# Patient Record
Sex: FEMALE | Race: WHITE | NOT HISPANIC OR LATINO | Employment: FULL TIME | URBAN - METROPOLITAN AREA
[De-identification: names, ages, dates, MRNs, and addresses within clinical notes are randomized per-mention and may not be internally consistent; named-entity substitution may affect disease eponyms.]

---

## 2018-09-04 ENCOUNTER — HOSPITAL ENCOUNTER (EMERGENCY)
Facility: HOSPITAL | Age: 18
Discharge: HOME | End: 2018-09-04
Attending: EMERGENCY MEDICINE
Payer: COMMERCIAL

## 2018-09-04 ENCOUNTER — OFFICE VISIT (OUTPATIENT)
Dept: UROLOGY | Facility: CLINIC | Age: 18
End: 2018-09-04
Payer: COMMERCIAL

## 2018-09-04 VITALS
WEIGHT: 175 LBS | OXYGEN SATURATION: 100 % | HEART RATE: 90 BPM | RESPIRATION RATE: 20 BRPM | BODY MASS INDEX: 29.88 KG/M2 | SYSTOLIC BLOOD PRESSURE: 132 MMHG | HEIGHT: 64 IN | DIASTOLIC BLOOD PRESSURE: 67 MMHG | TEMPERATURE: 99 F

## 2018-09-04 VITALS
SYSTOLIC BLOOD PRESSURE: 110 MMHG | HEART RATE: 66 BPM | WEIGHT: 183 LBS | BODY MASS INDEX: 31.24 KG/M2 | HEIGHT: 64 IN | DIASTOLIC BLOOD PRESSURE: 64 MMHG

## 2018-09-04 DIAGNOSIS — N13.2 URETERAL STONE WITH HYDRONEPHROSIS: Primary | ICD-10-CM

## 2018-09-04 DIAGNOSIS — N20.0 KIDNEY STONES: ICD-10-CM

## 2018-09-04 LAB
ALBUMIN SERPL BCP-MCNC: 4.3 G/DL
ALP SERPL-CCNC: 83 U/L
ALT SERPL W/O P-5'-P-CCNC: 28 U/L
ANION GAP SERPL CALC-SCNC: 10 MMOL/L
AST SERPL-CCNC: 27 U/L
B-HCG UR QL: NEGATIVE
BASOPHILS # BLD AUTO: 0.06 K/UL
BASOPHILS NFR BLD: 0.5 %
BILIRUB SERPL-MCNC: 0.5 MG/DL
BILIRUB UR QL STRIP: NEGATIVE
BUN SERPL-MCNC: 16 MG/DL
BUN SERPL-MCNC: 17 MG/DL (ref 6–30)
CALCIUM SERPL-MCNC: 9.5 MG/DL
CHLORIDE SERPL-SCNC: 104 MMOL/L (ref 95–110)
CHLORIDE SERPL-SCNC: 105 MMOL/L
CLARITY UR REFRACT.AUTO: ABNORMAL
CO2 SERPL-SCNC: 22 MMOL/L
COLOR UR AUTO: YELLOW
CREAT SERPL-MCNC: 1.1 MG/DL
CREAT SERPL-MCNC: 1.1 MG/DL (ref 0.5–1.4)
CTP QC/QA: YES
DIFFERENTIAL METHOD: NORMAL
EOSINOPHIL # BLD AUTO: 0.3 K/UL
EOSINOPHIL NFR BLD: 3 %
ERYTHROCYTE [DISTWIDTH] IN BLOOD BY AUTOMATED COUNT: 14.4 %
EST. GFR  (AFRICAN AMERICAN): >60 ML/MIN/1.73 M^2
EST. GFR  (NON AFRICAN AMERICAN): >60 ML/MIN/1.73 M^2
GLUCOSE SERPL-MCNC: 97 MG/DL
GLUCOSE SERPL-MCNC: 99 MG/DL (ref 70–110)
GLUCOSE UR QL STRIP: NEGATIVE
HCT VFR BLD AUTO: 39.5 %
HCT VFR BLD CALC: 40 %PCV (ref 36–54)
HGB BLD-MCNC: 12.8 G/DL
HGB UR QL STRIP: NEGATIVE
IMM GRANULOCYTES # BLD AUTO: 0.02 K/UL
IMM GRANULOCYTES NFR BLD AUTO: 0.2 %
KETONES UR QL STRIP: NEGATIVE
LEUKOCYTE ESTERASE UR QL STRIP: NEGATIVE
LIPASE SERPL-CCNC: 22 U/L
LYMPHOCYTES # BLD AUTO: 3 K/UL
LYMPHOCYTES NFR BLD: 26.3 %
MCH RBC QN AUTO: 29.6 PG
MCHC RBC AUTO-ENTMCNC: 32.4 G/DL
MCV RBC AUTO: 91 FL
MONOCYTES # BLD AUTO: 0.9 K/UL
MONOCYTES NFR BLD: 8.1 %
NEUTROPHILS # BLD AUTO: 7 K/UL
NEUTROPHILS NFR BLD: 61.9 %
NITRITE UR QL STRIP: NEGATIVE
NRBC BLD-RTO: 0 /100 WBC
PH UR STRIP: 5 [PH] (ref 5–8)
PLATELET # BLD AUTO: 300 K/UL
PMV BLD AUTO: 10.3 FL
POC IONIZED CALCIUM: 1.18 MMOL/L (ref 1.06–1.42)
POC TCO2 (MEASURED): 23 MMOL/L (ref 23–29)
POTASSIUM BLD-SCNC: 4 MMOL/L (ref 3.5–5.1)
POTASSIUM SERPL-SCNC: 4 MMOL/L
PROT SERPL-MCNC: 7.6 G/DL
PROT UR QL STRIP: NEGATIVE
RBC # BLD AUTO: 4.33 M/UL
SAMPLE: NORMAL
SODIUM BLD-SCNC: 139 MMOL/L (ref 136–145)
SODIUM SERPL-SCNC: 137 MMOL/L
SP GR UR STRIP: >1.03 (ref 1–1.03)
URN SPEC COLLECT METH UR: ABNORMAL
UROBILINOGEN UR STRIP-ACNC: NEGATIVE EU/DL
WBC # BLD AUTO: 11.35 K/UL

## 2018-09-04 PROCEDURE — 3008F BODY MASS INDEX DOCD: CPT | Mod: CPTII,S$GLB,, | Performed by: UROLOGY

## 2018-09-04 PROCEDURE — 81003 URINALYSIS AUTO W/O SCOPE: CPT

## 2018-09-04 PROCEDURE — 83690 ASSAY OF LIPASE: CPT

## 2018-09-04 PROCEDURE — 99284 EMERGENCY DEPT VISIT MOD MDM: CPT | Mod: ,,, | Performed by: PHYSICIAN ASSISTANT

## 2018-09-04 PROCEDURE — 96375 TX/PRO/DX INJ NEW DRUG ADDON: CPT | Mod: 59

## 2018-09-04 PROCEDURE — 99284 EMERGENCY DEPT VISIT MOD MDM: CPT | Mod: 25

## 2018-09-04 PROCEDURE — 99999 PR PBB SHADOW E&M-EST. PATIENT-LVL III: CPT | Mod: PBBFAC,,, | Performed by: UROLOGY

## 2018-09-04 PROCEDURE — 63600175 PHARM REV CODE 636 W HCPCS: Performed by: PHYSICIAN ASSISTANT

## 2018-09-04 PROCEDURE — 80053 COMPREHEN METABOLIC PANEL: CPT

## 2018-09-04 PROCEDURE — 96374 THER/PROPH/DIAG INJ IV PUSH: CPT

## 2018-09-04 PROCEDURE — 81025 URINE PREGNANCY TEST: CPT | Performed by: EMERGENCY MEDICINE

## 2018-09-04 PROCEDURE — 25500020 PHARM REV CODE 255: Performed by: EMERGENCY MEDICINE

## 2018-09-04 PROCEDURE — 85025 COMPLETE CBC W/AUTO DIFF WBC: CPT

## 2018-09-04 PROCEDURE — 99204 OFFICE O/P NEW MOD 45 MIN: CPT | Mod: S$GLB,,, | Performed by: UROLOGY

## 2018-09-04 PROCEDURE — 25000003 PHARM REV CODE 250: Performed by: PHYSICIAN ASSISTANT

## 2018-09-04 PROCEDURE — 96361 HYDRATE IV INFUSION ADD-ON: CPT

## 2018-09-04 RX ORDER — OXYCODONE AND ACETAMINOPHEN 5; 325 MG/1; MG/1
1 TABLET ORAL EVERY 4 HOURS PRN
Qty: 15 TABLET | Refills: 0 | Status: SHIPPED | OUTPATIENT
Start: 2018-09-04 | End: 2022-01-21

## 2018-09-04 RX ORDER — TAMSULOSIN HYDROCHLORIDE 0.4 MG/1
0.4 CAPSULE ORAL
Status: COMPLETED | OUTPATIENT
Start: 2018-09-04 | End: 2018-09-04

## 2018-09-04 RX ORDER — MORPHINE SULFATE 4 MG/ML
2 INJECTION, SOLUTION INTRAMUSCULAR; INTRAVENOUS
Status: COMPLETED | OUTPATIENT
Start: 2018-09-04 | End: 2018-09-04

## 2018-09-04 RX ORDER — ALBUTEROL SULFATE 90 UG/1
AEROSOL, METERED RESPIRATORY (INHALATION)
Refills: 2 | COMMUNITY
Start: 2018-08-20 | End: 2022-01-21

## 2018-09-04 RX ORDER — ONDANSETRON 2 MG/ML
4 INJECTION INTRAMUSCULAR; INTRAVENOUS
Status: COMPLETED | OUTPATIENT
Start: 2018-09-04 | End: 2018-09-04

## 2018-09-04 RX ORDER — TAMSULOSIN HYDROCHLORIDE 0.4 MG/1
0.4 CAPSULE ORAL DAILY
Qty: 20 CAPSULE | Refills: 0 | Status: SHIPPED | OUTPATIENT
Start: 2018-09-04 | End: 2022-01-21

## 2018-09-04 RX ORDER — KETOROLAC TROMETHAMINE 10 MG/1
10 TABLET, FILM COATED ORAL EVERY 6 HOURS PRN
Qty: 10 TABLET | Refills: 0 | Status: SHIPPED | OUTPATIENT
Start: 2018-09-04 | End: 2018-09-09

## 2018-09-04 RX ORDER — OXYCODONE AND ACETAMINOPHEN 5; 325 MG/1; MG/1
1 TABLET ORAL
Status: COMPLETED | OUTPATIENT
Start: 2018-09-04 | End: 2018-09-04

## 2018-09-04 RX ORDER — IBUPROFEN 800 MG/1
TABLET ORAL
Refills: 0 | COMMUNITY
Start: 2018-06-21 | End: 2018-09-04

## 2018-09-04 RX ORDER — KETOROLAC TROMETHAMINE 30 MG/ML
10 INJECTION, SOLUTION INTRAMUSCULAR; INTRAVENOUS
Status: COMPLETED | OUTPATIENT
Start: 2018-09-04 | End: 2018-09-04

## 2018-09-04 RX ORDER — MORPHINE SULFATE 4 MG/ML
4 INJECTION, SOLUTION INTRAMUSCULAR; INTRAVENOUS
Status: DISCONTINUED | OUTPATIENT
Start: 2018-09-04 | End: 2018-09-04

## 2018-09-04 RX ORDER — ACETAMINOPHEN AND CODEINE PHOSPHATE 300; 30 MG/1; MG/1
1 TABLET ORAL EVERY 6 HOURS PRN
Refills: 0 | COMMUNITY
Start: 2018-06-21 | End: 2022-01-21

## 2018-09-04 RX ORDER — ONDANSETRON HYDROCHLORIDE 8 MG/1
8 TABLET, FILM COATED ORAL EVERY 8 HOURS PRN
Qty: 20 TABLET | Refills: 0 | Status: SHIPPED | OUTPATIENT
Start: 2018-09-04 | End: 2022-01-21

## 2018-09-04 RX ORDER — ISOTRETINOIN 40 MG/1
40 CAPSULE ORAL 2 TIMES DAILY
COMMUNITY
End: 2022-01-21

## 2018-09-04 RX ADMIN — TAMSULOSIN HYDROCHLORIDE 0.4 MG: 0.4 CAPSULE ORAL at 04:09

## 2018-09-04 RX ADMIN — MORPHINE SULFATE 2 MG: 4 INJECTION INTRAVENOUS at 04:09

## 2018-09-04 RX ADMIN — SODIUM CHLORIDE 1000 ML: 0.9 INJECTION, SOLUTION INTRAVENOUS at 04:09

## 2018-09-04 RX ADMIN — OXYCODONE HYDROCHLORIDE AND ACETAMINOPHEN 1 TABLET: 5; 325 TABLET ORAL at 04:09

## 2018-09-04 RX ADMIN — SODIUM CHLORIDE 1000 ML: 0.9 INJECTION, SOLUTION INTRAVENOUS at 03:09

## 2018-09-04 RX ADMIN — ONDANSETRON HYDROCHLORIDE 4 MG: 2 INJECTION, SOLUTION INTRAMUSCULAR; INTRAVENOUS at 03:09

## 2018-09-04 RX ADMIN — ONDANSETRON HYDROCHLORIDE 4 MG: 2 INJECTION, SOLUTION INTRAMUSCULAR; INTRAVENOUS at 04:09

## 2018-09-04 RX ADMIN — KETOROLAC TROMETHAMINE 10 MG: 30 INJECTION, SOLUTION INTRAMUSCULAR at 03:09

## 2018-09-04 RX ADMIN — IOHEXOL 75 ML: 350 INJECTION, SOLUTION INTRAVENOUS at 03:09

## 2018-09-04 NOTE — DISCHARGE INSTRUCTIONS
Use Flomax once  daily until you passed the stone   Use Zofran as needed for nausea   Use Percocet as needed for pain  Strain all care   Drink plenty of fluids  Follow up with a urologist   Return to the ER for any new symptoms      Our goal in the emergency department is to always give you outstanding care and exceptional service. You may receive a survey by mail or e-mail in the next week regarding your experience in our ED. We would greatly appreciate your completing and returning the survey. Your feedback provides us with a way to recognize our staff who give very good care and it helps us learn how to improve when your experience was below our aspiration of excellence.

## 2018-09-04 NOTE — ED TRIAGE NOTES
Pt presents to the ED with c/o intermittent LRQ abd pain since 11pm. Pt reports pain 8/10 and +nausea. Pt denies V/D.     Patient identifiers verified and correct   LOC: The patient is awake, alert and aware of environment with an appropriate affect, the patient is oriented x 3 and speaking appropriately.   APPEARANCE: Patient appears comfortable and in no acute distress, patient is clean and well groomed.  SKIN: The skin is warm and dry, color consistent with ethnicity, patient has normal skin turgor and moist mucus membranes, skin intact, no breakdown or bruising noted.   MUSCULOSKELETAL: Patient moving all extremities spontaneously, no swelling noted.  RESPIRATORY: Airway is open and patent, respirations are spontaneous, patient has a normal effort and rate, no accessory muscle use noted

## 2018-09-04 NOTE — PATIENT INSTRUCTIONS
Low oxalate diet (limit spinach, rhubarb, nuts, beets, potatoes, chocolate)  2-3 liters of water a day. (avoid sodas, iced tea)  Limit salt.  Lots of fruits and veggies.  Limit non-dairy animal protein  Make sure you have milk and or yogurt in the diet  No tums, rolaids, vitamin C supplements, Multivitamin

## 2018-09-04 NOTE — ED PROVIDER NOTES
Encounter Date: 9/4/2018    SCRIBE #1 NOTE: I, Leland Bishop, am scribing for, and in the presence of,  Dr. Owen. I have scribed the following portions of the note - the APC attestation.       History     Chief Complaint   Patient presents with    Abdominal Pain      rlq pain that began ar ound 11 pm last night that has been intermittent since onsent with associated nausea. she reports feeling similar pain sat , but that it went away and is now back       18-year-old female presents to the ER for evaluation of right lower quadrant abdominal pain.  Pain began 3 days ago and has been intermittent.  Last night around 11:00 p.m. The pain got much worse.  She endorses nausea without vomiting.  Pain is localized to the right lower quadrant.  She denies any trauma or injury.  She denies any changes in bowel or bladder.  Last menstrual cycle was 3 weeks ago.          Review of patient's allergies indicates:  No Known Allergies  History reviewed. No pertinent past medical history.  History reviewed. No pertinent surgical history.  Family History   Problem Relation Age of Onset    Diabetes Maternal Grandmother      Social History     Tobacco Use    Smoking status: Never Smoker    Smokeless tobacco: Never Used   Substance Use Topics    Alcohol use: No     Frequency: Never    Drug use: No     Review of Systems   Constitutional: Negative for fever.   HENT: Negative for sore throat.    Respiratory: Negative for shortness of breath.    Cardiovascular: Negative for chest pain.   Gastrointestinal: Positive for abdominal pain and nausea.   Genitourinary: Negative for dysuria.   Musculoskeletal: Negative for back pain.   Skin: Negative for rash.   Neurological: Negative for weakness.   Hematological: Does not bruise/bleed easily.       Physical Exam     Initial Vitals [09/04/18 0238]   BP Pulse Resp Temp SpO2   (!) 151/88 88 20 98.7 °F (37.1 °C) 100 %      MAP       --         Physical Exam    Constitutional: Vital signs are  normal. She appears well-developed and well-nourished.   HENT:   Head: Normocephalic and atraumatic.   Eyes: Conjunctivae are normal.   Cardiovascular: Normal rate and regular rhythm.   Abdominal: Soft. Normal appearance and bowel sounds are normal. There is tenderness. There is tenderness at McBurney's point.   Musculoskeletal: Normal range of motion.   Neurological: She is alert and oriented to person, place, and time.   Skin: Skin is warm and intact.   Psychiatric: She has a normal mood and affect. Her speech is normal and behavior is normal. Cognition and memory are normal.         ED Course   Procedures  Labs Reviewed   COMPREHENSIVE METABOLIC PANEL - Abnormal; Notable for the following components:       Result Value    CO2 22 (*)     All other components within normal limits   URINALYSIS, REFLEX TO URINE CULTURE - Abnormal; Notable for the following components:    Appearance, UA Hazy (*)     Specific Gravity, UA >1.030 (*)     All other components within normal limits    Narrative:     Received a cup of urine.   CBC W/ AUTO DIFFERENTIAL   LIPASE   POCT URINE PREGNANCY   ISTAT PROCEDURE   ISTAT CHEM8          Imaging Results          CT Abdomen Pelvis With Contrast (Final result)     Abnormal  Result time 09/04/18 04:03:22    Final result by Sae Cook MD (09/04/18 04:03:22)                 Impression:      Visualized appendix unremarkable.    0.5 cm obstructive ureterolith at the right UPJ with associated moderate hydronephrosis of the right kidney.    Additional punctate calcifications seen within the left collecting system which likely represent additional nonobstructing nephroliths.    Small volume of free fluid in the pelvis.    Extensive stool burden throughout the colon suggestive for constipation.    This report was flagged in Epic as abnormal.    Electronically signed by resident: Johny Hopson  Date:    09/04/2018  Time:    03:43    Electronically signed by: Sae Cook  MD  Date:    09/04/2018  Time:    04:03             Narrative:    EXAMINATION:  CT ABDOMEN PELVIS WITH CONTRAST    CLINICAL HISTORY:  RLQ pain, appendicitis suspected;    TECHNIQUE:  Low dose axial images, sagittal and coronal reformations were obtained from the lung bases to the pubic symphysis following the IV administration of 75 mL of Omnipaque 350 .  Oral contrast was not administered..    COMPARISON:  None    FINDINGS:  ABDOMEN:    - Heart base: Normal in size without pericardial effusion.    - Lung bases: Well expanded bilaterally without significant pleural effusion, consolidation, pneumothorax, or mass.    - Liver: Normal in size and attenuation without focal lesion.    - Gallbladder: No calcified gallstones.    - Bile Ducts: No evidence of intra or extra hepatic biliary ductal dilation.    - Spleen: Negative.    - Kidneys: Normal in size and location with normal contrast enhancement pattern.  There is an obstructive ureterolith within the right UPJ with associated moderate hydronephrosis.  Stone measures approximately 0.5 cm in diameter.  Additional punctate calcifications seen within the left collecting system likely representing additional nonobstructing nephroliths.    - Adrenals: Unremarkable.    - Pancreas: No mass or peripancreatic fat stranding.    - Retroperitoneum:  No significant adenopathy.    - Vascular: No abdominal aortic aneurysm.    - Abdominal wall:  Unremarkable.    PELVIS:    Reproductive organs within normal limits for patient's age.  Right ovarian cyst noted.  Small volume of free fluid within the pelvis.    BOWEL/MESENTERY:    No evidence of bowel obstruction or inflammation.  The visualized appendix within normal limits.  Extensive stool burden throughout the colon suggestive for constipation.    BONES:  No acute osseous abnormality and no suspicious lytic or blastic lesion.                                 Medical Decision Making:   History:   Old Medical Records: I decided to obtain  old medical records.  Clinical Tests:   Lab Tests: Ordered and Reviewed  Radiological Study: Ordered and Reviewed  ED Management:   18-year-old female with abdominal pain   no acute findings on labs or urinalysis   CT scan shows evidence of a 5 mm right ureteral stone with mild hydronephrosis   no evidence of infection,  Renal function maintained,  Patient is urinating well     plan:  Discharged home with pain medication, antiemetic medication and Flomax   strict return precautions given and recommend follow-up with Urology   patient discharged home.            Scribe Attestation:   Scribe #1: I performed the above scribed service and the documentation accurately describes the services I performed. I attest to the accuracy of the note.    Attending Attestation:     Physician Attestation Statement for NP/PA:   I discussed this assessment and plan of this patient with the NP/PA, but I did not personally examine the patient. The face to face encounter was performed by the NP/PA.                     Clinical Impression:   The encounter diagnosis was Ureteral stone with hydronephrosis.      Disposition:   Disposition: Discharged  Condition: Stable                        Dajuan Trejo PA-C  09/04/18 0442

## 2018-09-04 NOTE — PROGRESS NOTES
Subjective:       Patient ID: Jeremiah Medina is a 18 y.o. female.    Chief Complaint: Nephrolithiasis    Jeremiah Medina is a 18 y.o. Female here for kidney stones.   4 days ago, she had lower abdominal pain and then later that night sharp stabbing pain.   Pain resolved. Then 11pm last pm, continued pain and went to ER.     No fever. No n/v.     No previous hx of stones.   No family hx of stones.     No medical problems.     She plays volleyball for [employer].     She drinks mostly water for the last month. 60 ounces a day.   Over the summer a lot of water.   No MVI.   She takes a good big of ibuprofen.       No recurrent UTI.     No tums or rolaids.     Minimal oxalate.   Good bit of salt.         History reviewed. No pertinent surgical history.    History reviewed. No pertinent past medical history.    Social History     Socioeconomic History    Marital status: Single     Spouse name: Not on file    Number of children: Not on file    Years of education: Not on file    Highest education level: Not on file   Social Needs    Financial resource strain: Not on file    Food insecurity - worry: Not on file    Food insecurity - inability: Not on file    Transportation needs - medical: Not on file    Transportation needs - non-medical: Not on file   Occupational History    Not on file   Tobacco Use    Smoking status: Never Smoker    Smokeless tobacco: Never Used   Substance and Sexual Activity    Alcohol use: No     Frequency: Never    Drug use: No    Sexual activity: No   Other Topics Concern    Not on file   Social History Narrative    Not on file       Family History   Problem Relation Age of Onset    Diabetes Maternal Grandmother        Current Outpatient Medications   Medication Sig Dispense Refill    acetaminophen-codeine 300-30mg (TYLENOL #3) 300-30 mg Tab Take 1 tablet by mouth every 6 (six) hours as needed. FOR SEVERE PAIN  0     mg tablet TAKE ONE TABLET BY MOUTH THREE TIMES DAILY (start  AFTER ketorolac)  0    ISOtretinoin (ACCUTANE) 40 MG capsule Take 40 mg by mouth 2 (two) times daily.      ondansetron (ZOFRAN) 8 MG tablet Take 1 tablet (8 mg total) by mouth every 8 (eight) hours as needed for Nausea. 20 tablet 0    oxyCODONE-acetaminophen (PERCOCET) 5-325 mg per tablet Take 1 tablet by mouth every 4 (four) hours as needed for Pain. 15 tablet 0    tamsulosin (FLOMAX) 0.4 mg Cap Take 1 capsule (0.4 mg total) by mouth once daily. 20 capsule 0    VENTOLIN HFA 90 mcg/actuation inhaler INHALE TWO TO FOUR PUFFS 30 minutes prior TO exercise  2     No current facility-administered medications for this visit.        Review of patient's allergies indicates:  No Known Allergies     BMP  Lab Results   Component Value Date     09/04/2018    K 4.0 09/04/2018     09/04/2018    CO2 22 (L) 09/04/2018    BUN 16 09/04/2018    CREATININE 1.1 09/04/2018    CALCIUM 9.5 09/04/2018    ANIONGAP 10 09/04/2018    ESTGFRAFRICA >60.0 09/04/2018    EGFRNONAA >60.0 09/04/2018       Review of Systems   Constitutional: Negative for chills, fever and unexpected weight change.   HENT: Negative for nosebleeds.    Eyes: Negative for visual disturbance.   Respiratory: Negative for chest tightness.    Cardiovascular: Negative for chest pain.   Gastrointestinal: Negative for diarrhea.   Genitourinary: Negative for dysuria, frequency, nocturia and urgency.   Musculoskeletal: Positive for back pain. Negative for myalgias.   Skin: Negative for rash.   Neurological: Negative for seizures.   Hematological: Does not bruise/bleed easily.   Psychiatric/Behavioral: Negative for behavioral problems.     Objective:      Physical Exam   Vitals reviewed.  Constitutional: She is oriented to person, place, and time. She appears well-developed and well-nourished.   HENT:   Head: Normocephalic and atraumatic.   Eyes: No scleral icterus.   Cardiovascular: Normal rate and regular rhythm.    Pulmonary/Chest: Effort normal. No stridor. No  respiratory distress.   Abdominal: She exhibits no distension.   Musculoskeletal: She exhibits no deformity.   Neurological: She is alert and oriented to person, place, and time.   Skin: Skin is warm and dry. No rash noted.     Psychiatric: She has a normal mood and affect.     urine dip trace protein, pH 6  Assessment:       1. Kidney stones        Plan:   Jeremiah was seen today for nephrolithiasis.    Diagnoses and all orders for this visit:    Kidney stones  -     PTH, intact; Future  -     Uric acid; Future  -     X-Ray Abdomen AP 1 View; Future    Other orders  -     ketorolac (TORADOL) 10 mg tablet; Take 1 tablet (10 mg total) by mouth every 6 (six) hours as needed for Pain.      Low oxalate diet (limit spinach, rhubarb, nuts, beets, potatoes, chocolate)  2-3 liters of water a day. (avoid sodas, iced tea)  Limit salt.  Lots of fruits and veggies.  Limit non-dairy animal protein  Make sure you have milk and or yogurt in the diet  No tums, rolaids, vitamin C supplements, Multivitamin    Consider right ESWL/ureteroscopy as patient is a  sooner than later and has to travel with team.   Needs metabolic workup in future.   Needs KUB - got contrast in ER. KUB on Thursday.

## 2018-09-05 ENCOUNTER — PATIENT MESSAGE (OUTPATIENT)
Dept: UROLOGY | Facility: CLINIC | Age: 18
End: 2018-09-05

## 2018-09-06 ENCOUNTER — PATIENT MESSAGE (OUTPATIENT)
Dept: UROLOGY | Facility: CLINIC | Age: 18
End: 2018-09-06

## 2018-09-06 ENCOUNTER — HOSPITAL ENCOUNTER (OUTPATIENT)
Dept: RADIOLOGY | Facility: HOSPITAL | Age: 18
Discharge: HOME | End: 2018-09-06
Attending: UROLOGY
Payer: COMMERCIAL

## 2018-09-06 DIAGNOSIS — N20.0 KIDNEY STONES: ICD-10-CM

## 2018-09-06 PROCEDURE — 74018 RADEX ABDOMEN 1 VIEW: CPT | Mod: TC

## 2018-09-06 PROCEDURE — 74018 RADEX ABDOMEN 1 VIEW: CPT | Mod: 26,,, | Performed by: RADIOLOGY

## 2018-09-11 ENCOUNTER — TELEPHONE (OUTPATIENT)
Dept: UROLOGY | Facility: CLINIC | Age: 18
End: 2018-09-11

## 2018-09-11 DIAGNOSIS — N20.0 KIDNEY STONES: Primary | ICD-10-CM

## 2018-09-12 ENCOUNTER — PATIENT MESSAGE (OUTPATIENT)
Dept: UROLOGY | Facility: CLINIC | Age: 18
End: 2018-09-12

## 2018-09-12 ENCOUNTER — TELEPHONE (OUTPATIENT)
Dept: UROLOGY | Facility: CLINIC | Age: 18
End: 2018-09-12

## 2018-09-13 ENCOUNTER — TELEPHONE (OUTPATIENT)
Dept: UROLOGY | Facility: CLINIC | Age: 18
End: 2018-09-13

## 2018-09-13 NOTE — TELEPHONE ENCOUNTER
Called pt to confirm 10:45am arrival time for procedure. Gave pt NPO instructions and gave pt opportunity to ask questions. Pt verbalized understanding.

## 2018-09-14 ENCOUNTER — ANESTHESIA (OUTPATIENT)
Dept: SURGERY | Facility: HOSPITAL | Age: 18
End: 2018-09-14
Payer: COMMERCIAL

## 2018-09-14 ENCOUNTER — HOSPITAL ENCOUNTER (OUTPATIENT)
Facility: HOSPITAL | Age: 18
Discharge: HOME | End: 2018-09-14
Attending: UROLOGY | Admitting: UROLOGY
Payer: COMMERCIAL

## 2018-09-14 ENCOUNTER — ANESTHESIA EVENT (OUTPATIENT)
Dept: SURGERY | Facility: HOSPITAL | Age: 18
End: 2018-09-14
Payer: COMMERCIAL

## 2018-09-14 VITALS
OXYGEN SATURATION: 99 % | HEIGHT: 64 IN | HEART RATE: 62 BPM | BODY MASS INDEX: 31.07 KG/M2 | RESPIRATION RATE: 17 BRPM | DIASTOLIC BLOOD PRESSURE: 89 MMHG | WEIGHT: 182 LBS | SYSTOLIC BLOOD PRESSURE: 143 MMHG | TEMPERATURE: 98 F

## 2018-09-14 DIAGNOSIS — N20.9 UROLITHIASIS: Primary | ICD-10-CM

## 2018-09-14 DIAGNOSIS — N20.1 URETERAL STONE: ICD-10-CM

## 2018-09-14 LAB
B-HCG UR QL: NEGATIVE
CTP QC/QA: YES

## 2018-09-14 PROCEDURE — 27201423 OPTIME MED/SURG SUP & DEVICES STERILE SUPPLY: Performed by: UROLOGY

## 2018-09-14 PROCEDURE — C1758 CATHETER, URETERAL: HCPCS | Performed by: UROLOGY

## 2018-09-14 PROCEDURE — 36000706: Performed by: UROLOGY

## 2018-09-14 PROCEDURE — 52332 CYSTOSCOPY AND TREATMENT: CPT | Mod: 51,RT,, | Performed by: UROLOGY

## 2018-09-14 PROCEDURE — 37000009 HC ANESTHESIA EA ADD 15 MINS: Performed by: UROLOGY

## 2018-09-14 PROCEDURE — 37000008 HC ANESTHESIA 1ST 15 MINUTES: Performed by: UROLOGY

## 2018-09-14 PROCEDURE — 63600175 PHARM REV CODE 636 W HCPCS: Performed by: NURSE ANESTHETIST, CERTIFIED REGISTERED

## 2018-09-14 PROCEDURE — 74420 UROGRAPHY RTRGR +-KUB: CPT | Mod: 26,,, | Performed by: UROLOGY

## 2018-09-14 PROCEDURE — C2617 STENT, NON-COR, TEM W/O DEL: HCPCS | Performed by: UROLOGY

## 2018-09-14 PROCEDURE — 25000003 PHARM REV CODE 250: Performed by: UROLOGY

## 2018-09-14 PROCEDURE — 52352 CYSTOURETERO W/STONE REMOVE: CPT | Mod: RT,,, | Performed by: UROLOGY

## 2018-09-14 PROCEDURE — 71000044 HC DOSC ROUTINE RECOVERY FIRST HOUR: Performed by: UROLOGY

## 2018-09-14 PROCEDURE — D9220A PRA ANESTHESIA: Mod: CRNA,,, | Performed by: NURSE ANESTHETIST, CERTIFIED REGISTERED

## 2018-09-14 PROCEDURE — 25500020 PHARM REV CODE 255: Performed by: UROLOGY

## 2018-09-14 PROCEDURE — D9220A PRA ANESTHESIA: Mod: ANES,,, | Performed by: ANESTHESIOLOGY

## 2018-09-14 PROCEDURE — 36000707: Performed by: UROLOGY

## 2018-09-14 PROCEDURE — 81025 URINE PREGNANCY TEST: CPT | Performed by: UROLOGY

## 2018-09-14 PROCEDURE — 63600175 PHARM REV CODE 636 W HCPCS: Performed by: STUDENT IN AN ORGANIZED HEALTH CARE EDUCATION/TRAINING PROGRAM

## 2018-09-14 PROCEDURE — 82365 CALCULUS SPECTROSCOPY: CPT

## 2018-09-14 PROCEDURE — 71000015 HC POSTOP RECOV 1ST HR: Performed by: UROLOGY

## 2018-09-14 PROCEDURE — C1769 GUIDE WIRE: HCPCS | Performed by: UROLOGY

## 2018-09-14 PROCEDURE — 76000 FLUOROSCOPY <1 HR PHYS/QHP: CPT | Mod: 26,59,, | Performed by: UROLOGY

## 2018-09-14 DEVICE — STENT URETERAL UNIV 6FR 24CM: Type: IMPLANTABLE DEVICE | Site: URETER | Status: FUNCTIONAL

## 2018-09-14 RX ORDER — CEFAZOLIN SODIUM 1 G/3ML
2 INJECTION, POWDER, FOR SOLUTION INTRAMUSCULAR; INTRAVENOUS
Status: COMPLETED | OUTPATIENT
Start: 2018-09-14 | End: 2018-09-14

## 2018-09-14 RX ORDER — DIPHENHYDRAMINE HYDROCHLORIDE 50 MG/ML
25 INJECTION INTRAMUSCULAR; INTRAVENOUS EVERY 6 HOURS PRN
Status: DISCONTINUED | OUTPATIENT
Start: 2018-09-14 | End: 2018-09-14 | Stop reason: HOSPADM

## 2018-09-14 RX ORDER — KETOROLAC TROMETHAMINE 10 MG/1
10 TABLET, FILM COATED ORAL EVERY 6 HOURS PRN
Qty: 12 TABLET | Refills: 0 | Status: SHIPPED | OUTPATIENT
Start: 2018-09-14 | End: 2022-01-21

## 2018-09-14 RX ORDER — SODIUM CHLORIDE 9 MG/ML
INJECTION, SOLUTION INTRAVENOUS CONTINUOUS
Status: DISCONTINUED | OUTPATIENT
Start: 2018-09-14 | End: 2018-09-14 | Stop reason: HOSPADM

## 2018-09-14 RX ORDER — SODIUM CHLORIDE 0.9 % (FLUSH) 0.9 %
3 SYRINGE (ML) INJECTION
Status: DISCONTINUED | OUTPATIENT
Start: 2018-09-14 | End: 2018-09-14 | Stop reason: HOSPADM

## 2018-09-14 RX ORDER — FENTANYL CITRATE 50 UG/ML
INJECTION, SOLUTION INTRAMUSCULAR; INTRAVENOUS
Status: DISCONTINUED | OUTPATIENT
Start: 2018-09-14 | End: 2018-09-14

## 2018-09-14 RX ORDER — MIDAZOLAM HYDROCHLORIDE 1 MG/ML
INJECTION, SOLUTION INTRAMUSCULAR; INTRAVENOUS
Status: DISCONTINUED | OUTPATIENT
Start: 2018-09-14 | End: 2018-09-14

## 2018-09-14 RX ORDER — HYDROMORPHONE HYDROCHLORIDE 1 MG/ML
0.2 INJECTION, SOLUTION INTRAMUSCULAR; INTRAVENOUS; SUBCUTANEOUS EVERY 5 MIN PRN
Status: DISCONTINUED | OUTPATIENT
Start: 2018-09-14 | End: 2018-09-14 | Stop reason: HOSPADM

## 2018-09-14 RX ORDER — LIDOCAINE HYDROCHLORIDE 10 MG/ML
1 INJECTION, SOLUTION EPIDURAL; INFILTRATION; INTRACAUDAL; PERINEURAL ONCE
Status: COMPLETED | OUTPATIENT
Start: 2018-09-14 | End: 2018-09-14

## 2018-09-14 RX ORDER — ONDANSETRON 2 MG/ML
INJECTION INTRAMUSCULAR; INTRAVENOUS
Status: DISCONTINUED | OUTPATIENT
Start: 2018-09-14 | End: 2018-09-14

## 2018-09-14 RX ORDER — OXYCODONE AND ACETAMINOPHEN 5; 325 MG/1; MG/1
1 TABLET ORAL EVERY 4 HOURS PRN
Status: DISCONTINUED | OUTPATIENT
Start: 2018-09-14 | End: 2018-09-14 | Stop reason: HOSPADM

## 2018-09-14 RX ORDER — PROPOFOL 10 MG/ML
VIAL (ML) INTRAVENOUS
Status: DISCONTINUED | OUTPATIENT
Start: 2018-09-14 | End: 2018-09-14

## 2018-09-14 RX ORDER — LIDOCAINE HCL/PF 100 MG/5ML
SYRINGE (ML) INTRAVENOUS
Status: DISCONTINUED | OUTPATIENT
Start: 2018-09-14 | End: 2018-09-14

## 2018-09-14 RX ORDER — LORAZEPAM 2 MG/ML
0.25 INJECTION INTRAMUSCULAR ONCE AS NEEDED
Status: DISCONTINUED | OUTPATIENT
Start: 2018-09-14 | End: 2018-09-14 | Stop reason: HOSPADM

## 2018-09-14 RX ADMIN — SODIUM CHLORIDE: 0.9 INJECTION, SOLUTION INTRAVENOUS at 01:09

## 2018-09-14 RX ADMIN — FENTANYL CITRATE 50 MCG: 50 INJECTION, SOLUTION INTRAMUSCULAR; INTRAVENOUS at 01:09

## 2018-09-14 RX ADMIN — ONDANSETRON 4 MG: 2 INJECTION INTRAMUSCULAR; INTRAVENOUS at 01:09

## 2018-09-14 RX ADMIN — LIDOCAINE HYDROCHLORIDE 2 MG: 10 INJECTION, SOLUTION EPIDURAL; INFILTRATION; INTRACAUDAL at 11:09

## 2018-09-14 RX ADMIN — SODIUM CHLORIDE: 0.9 INJECTION, SOLUTION INTRAVENOUS at 11:09

## 2018-09-14 RX ADMIN — CEFAZOLIN 2 G: 330 INJECTION, POWDER, FOR SOLUTION INTRAMUSCULAR; INTRAVENOUS at 01:09

## 2018-09-14 RX ADMIN — PROPOFOL 200 MG: 10 INJECTION, EMULSION INTRAVENOUS at 01:09

## 2018-09-14 RX ADMIN — LIDOCAINE HYDROCHLORIDE 100 MG: 20 INJECTION, SOLUTION INTRAVENOUS at 01:09

## 2018-09-14 RX ADMIN — MIDAZOLAM HYDROCHLORIDE 2 MG: 1 INJECTION, SOLUTION INTRAMUSCULAR; INTRAVENOUS at 01:09

## 2018-09-14 NOTE — INTERVAL H&P NOTE
The patient has been examined and the H&P has been reviewed:    I concur with the findings and no changes have occurred since H&P was written.    Anesthesia/Surgery risks, benefits and alternative options discussed and understood by patient/family.          Active Hospital Problems    Diagnosis  POA    Urolithiasis [N20.9]  Yes      Resolved Hospital Problems   No resolved problems to display.

## 2018-09-14 NOTE — OP NOTE
[facility]  Operative Note    Date: 09/14/2018    Pre-Op Diagnosis: right ureteral stone    Post-Op Diagnosis: same    Procedure(s) Performed:   1.  Right ureteroscopy with stone basket extraction  2.  Cystoscopy  3.  Right retrograde pyelogram  4.  Right ureteral stent insertion  5.  Fluoro < 1 h with radiographic interpretation    Specimen(s): ureteral stone    Staff Surgeon: Mian Childs MD    Assistant Surgeon: Edward Burrows MD    Anesthesia: General LMA anesthesia    Indications: Jeremiah Medina is a 18 y.o. female with a right ureteral stone, presenting for definitive stone management.  She currently does not have a JJ ureteral stent in place.      Findings:   Right 6 mm ureteral stone removed with basket extraction.      Estimated Blood Loss: min    Drains: 6 Fr x 24 cm JJ ureteral stent with strings    Procedure in detail:  After informed consent was obtained, the patient was brought the the cystoscopy suite and placed in the supine position.  SCDs were applied and working.  Anesthesia was administered.  The patient was then placed in the dorsal lithotomy position and prepped and draped in the usual sterile fashion.      A rigid cystoscope in a 22 Fr sheath was introduced into the patient's urethra.  This passed easily.  The entire urethra was visualized which showed no strictures or masses.  Formal cystoscopy was performed which revealed no masses or lesions suspicious for malignancy, no bladder stones, no bladder diverticuli, no trabeculations.  The ureteral orifices were visualized in the normal anatomic position bilaterally and clear efflux was visualized.      A motion wire was passed up the right ureteral orifice and up into the kidney.  This passed easily and placement was confirmed using fluoro.  The cystoscope was removed keeping the guidewire in place and the wire was secured to the drape.      An 8 Fr rigid ureteroscope was passed into the patient's bladder alongside the wire  under direct vision.  It was then passed through the right ureteral orifice alongside the wire.  A stone was encountered at the level of the proximal ureter just distal to the UPJ.  A Nitinol tipless basket was introduced through the ureteroscope.  The stone was removed from the ureter and taken out of the patient. The ureteroscope was reintroduced into the bladder and up into the right ureter to the level of the UPJ. There were no additional right ureteral stones. A right retrograde pyelogram was performed to map out the renal pelvis and proximal ureter. The ureteroscope was removed keeping the wire in place.  A 6 Fr x 24 cm JJ ureteral stent with strings was passed over the wire and up into the renal pelvis using fluoro.  When the coil appeared to be in good position in the kidney and the radio-opaque marker of the pusher was at the inferior pubis, the wire was removed under continuous fluoro.  Good coils were seen in the kidney and the bladder using fluoro.      The patient tolerated the procedure well and was transferred to the recovery room in stable condition.      Disposition:  The patient will follow up with Dr. Childs in 6 weeks. She was given prescriptions for toradol and told to continue her percocet and flomax.     Edward Burrows MD

## 2018-09-14 NOTE — DISCHARGE INSTRUCTIONS
Pull stent strings on Wednesday!!!!    Ureteral Stents  A ureteral stent is a soft plastic tube with holes in it. Its temporarily inserted into a ureter to help drain urine into the bladder. One end goes in the kidney. The other end goes in the bladder. A coil on each end holds the stent in place. The stent cant be seen from outside the body. It shouldnt interfere with your normal routine. Your stent will be put in by a doctor trained in treating the urinary tract (a urologist) or another specialist. The procedure is done in a hospital or surgery center. Youll likely go home the same day.  When is a ureteral stent used?  A ureteral stent may be used:  · To bypass a blockage in a kidney or ureter.  · During kidney stone removal.  · To let a ureter heal after surgery.    Before the Procedure  Your healthcare provider will give you instructions to prepare for the procedure. X-rays or other imaging tests of your kidneys and ureters may be done beforehand.  During the procedure  · You receive medicine to prevent pain and help you relax or sleep during the procedure. Once this takes effect, the procedure starts.  · The doctor inserts a cystoscope (lighted instrument) through the urethra and into the bladder. This shows the opening to the ureter.  · A thin wire is carefully threaded through the cystoscope, up the ureter, and into the kidney. The stent is inserted over the wire.  · A fluoroscope (special X-ray machine) is used to help position the stent. When the stent is in place, the wire and cystoscope are removed.  While you have a stent  · Some discomfort is normal. Certain movements may trigger pain or a feeling that you need to urinate. You may also feel mild soreness or pressure before or during urination. These symptoms will go away a few days after the stent is removed.  · Medicine to control pain or bladder spasms or to prevent infection may be prescribed. Take this as directed.  · Drink plenty of fluids to  help flush out your urinary tract.  · Your urine may be slightly pink or red. This is due to bleeding caused by minor irritation from the stent. This may happen on and off while you have the stent.  · As with any synthetic device placed in the body, there is a risk of infection. The stent may have to be removed if this happens.   How long will you need a stent?  The stent is often taken out after the blockage in the ureter is treated or the ureter has healed. This may take 1 week to 2 weeks, or longer. If a stent is needed for a long time, it may need to be changed every few months.  When to call your healthcare provider  Contact your healthcare provider right away if:  · Your urine contains blood clots or you see a large amount of blood-tinged urine  · You have symptoms similar to those you had before the stent was placed  · You constantly leak urine  · You have a fever over 100.4°F (38°C), chills, nausea, or vomiting  · Your pain is not relieved with medicine  · The end of the stent comes out of the urethra   Date Last Reviewed: 1/1/2017  © 2865-5351 [employer]. [address] [place] [address]. All rights reserved. This information is not intended as a substitute for professional medical care. Always follow your healthcare professional's instructions.        Discharge Instructions: After Your Surgery  Youve just had surgery. During surgery, you were given medicine called anesthesia to keep you relaxed and free of pain. After surgery, you may have some pain or nausea. This is common. Here are some tips for feeling better and getting well after surgery.     Stay on schedule with your medicine.   Going home  Your healthcare provider will show you how to take care of yourself when you go home. He or she will also answer your questions. Have an adult family member or friend drive you home. For the first 24 hours after your surgery:  · Do not drive or use heavy equipment.  · Do not make  important decisions or sign legal papers.  · Do not drink alcohol.  · Have someone stay with you, if needed. He or she can watch for problems and help keep you safe.  Be sure to go to all follow-up visits with your healthcare provider. And rest after your surgery for as long as your healthcare provider tells you to.  Coping with pain  If you have pain after surgery, pain medicine will help you feel better. Take it as told, before pain becomes severe. Also, ask your healthcare provider or pharmacist about other ways to control pain. This might be with heat, ice, or relaxation. And follow any other instructions your surgeon or nurse gives you.  Tips for taking pain medicine  To get the best relief possible, remember these points:  · Pain medicines can upset your stomach. Taking them with a little food may help.  · Most pain relievers taken by mouth need at least 20 to 30 minutes to start to work.  · Taking medicine on a schedule can help you remember to take it. Try to time your medicine so that you can take it before starting an activity. This might be before you get dressed, go for a walk, or sit down for dinner.  · Constipation is a common side effect of pain medicines. Call your healthcare provider before taking any medicines such as laxatives or stool softeners to help ease constipation. Also ask if you should skip any foods. Drinking lots of fluids and eating foods such as fruits and vegetables that are high in fiber can also help. Remember, do not take laxatives unless your surgeon has prescribed them.  · Drinking alcohol and taking pain medicine can cause dizziness and slow your breathing. It can even be deadly. Do not drink alcohol while taking pain medicine.  · Pain medicine can make you react more slowly to things. Do not drive or run machinery while taking pain medicine.  Your healthcare provider may tell you to take acetaminophen to help ease your pain. Ask him or her how much you are supposed to take  each day. Acetaminophen or other pain relievers may interact with your prescription medicines or other over-the-counter (OTC) medicines. Some prescription medicines have acetaminophen and other ingredients. Using both prescription and OTC acetaminophen for pain can cause you to overdose. Read the labels on your OTC medicines with care. This will help you to clearly know the list of ingredients, how much to take, and any warnings. It may also help you not take too much acetaminophen. If you have questions or do not understand the information, ask your pharmacist or healthcare provider to explain it to you before you take the OTC medicine.  Managing nausea  Some people have an upset stomach after surgery. This is often because of anesthesia, pain, or pain medicine, or the stress of surgery. These tips will help you handle nausea and eat healthy foods as you get better. If you were on a special food plan before surgery, ask your healthcare provider if you should follow it while you get better. These tips may help:  · Do not push yourself to eat. Your body will tell you when to eat and how much.  · Start off with clear liquids and soup. They are easier to digest.  · Next try semi-solid foods, such as mashed potatoes, applesauce, and gelatin, as you feel ready.  · Slowly move to solid foods. Dont eat fatty, rich, or spicy foods at first.  · Do not force yourself to have 3 large meals a day. Instead eat smaller amounts more often.  · Take pain medicines with a small amount of solid food, such as crackers or toast, to avoid nausea.     Call your surgeon if  · You still have pain an hour after taking medicine. The medicine may not be strong enough.  · You feel too sleepy, dizzy, or groggy. The medicine may be too strong.  · You have side effects like nausea, vomiting, or skin changes, such as rash, itching, or hives.       If you have obstructive sleep apnea  You were given anesthesia medicine during surgery to keep you  comfortable and free of pain. After surgery, you may have more apnea spells because of this medicine and other medicines you were given. The spells may last longer than usual.   At home:  · Keep using the continuous positive airway pressure (CPAP) device when you sleep. Unless your healthcare provider tells you not to, use it when you sleep, day or night. CPAP is a common device used to treat obstructive sleep apnea.  · Talk with your provider before taking any pain medicine, muscle relaxants, or sedatives. Your provider will tell you about the possible dangers of taking these medicines.  Date Last Reviewed: 12/1/2016  © 7216-9512 [employer]. [address] [place] [address], [place] [address]. All rights reserved. This information is not intended as a substitute for professional medical care. Always follow your healthcare professional's instructions.

## 2018-09-14 NOTE — PLAN OF CARE
Discharge instructions reviewed with pt and mother, handouts and prescription given,  verbalized understanding with no further questions at this time. Pt is to pull stent string Wednesday per MD order, with MD telephone number provided. VSS on RA, pt denies pain and nausea at this time, tolerating po fluids without difficulty, urinated 400 ml of clear red urine. Fall precautions reviewed, consents in chart, PIV removed.

## 2018-09-14 NOTE — TRANSFER OF CARE
"Anesthesia Transfer of Care Note    Patient: Jeremiah Medina    Procedure(s) Performed: Procedure(s) (LRB):  REMOVAL, CALCULUS, URETER, URETEROSCOPIC (Right)  CYSTOSCOPY, WITH URETERAL STENT INSERTION (Right)  CYSTOSCOPY, WITH RETROGRADE PYELOGRAM (Right)    Patient location: PACU    Anesthesia Type: general    Transport from OR: Transported from OR on 6-10 L/min O2 by face mask with adequate spontaneous ventilation    Post pain: adequate analgesia    Level of consciousness: awake and sedated    Nausea/Vomiting: no nausea/vomiting    Complications: none    Transfer of care protocol was followed      Last vitals:   Visit Vitals  /82 (BP Location: Left arm, Patient Position: Lying)   Pulse 78   Temp 37 °C (98.6 °F) (Temporal)   Resp 16   Ht 5' 4" (1.626 m)   Wt 82.6 kg (182 lb)   LMP 09/14/2018   SpO2 99%   Breastfeeding? No   BMI 31.24 kg/m²     "

## 2018-09-14 NOTE — DISCHARGE SUMMARY
[facility]  Discharge Note  Short Stay    Admit Date: 9/14/2018    Discharge Date and Time: 09/14/2018 2:05 PM      Attending Physician: Mian Chidls MD     Discharge Provider: Edward Burrows    Diagnoses:  Active Hospital Problems    Diagnosis  POA    *Urolithiasis [N20.9]  Yes      Resolved Hospital Problems   No resolved problems to display.       Discharged Condition: good    Hospital Course: Patient was admitted for ureteroscopy and tolerated the procedure well with no complications. The patient was discharged home in good condition on the same day.       Final Diagnoses: Same as principal problem.    Disposition: Home or Self Care    Follow up/Patient Instructions:    Medications:  Reconciled Home Medications:   Current Discharge Medication List      START taking these medications    Details   ketorolac (TORADOL) 10 mg tablet Take 1 tablet (10 mg total) by mouth every 6 (six) hours as needed for Pain.  Qty: 12 tablet, Refills: 0         CONTINUE these medications which have NOT CHANGED    Details   acetaminophen-codeine 300-30mg (TYLENOL #3) 300-30 mg Tab Take 1 tablet by mouth every 6 (six) hours as needed. FOR SEVERE PAIN  Refills: 0      ISOtretinoin (ACCUTANE) 40 MG capsule Take 40 mg by mouth 2 (two) times daily.      ondansetron (ZOFRAN) 8 MG tablet Take 1 tablet (8 mg total) by mouth every 8 (eight) hours as needed for Nausea.  Qty: 20 tablet, Refills: 0      oxyCODONE-acetaminophen (PERCOCET) 5-325 mg per tablet Take 1 tablet by mouth every 4 (four) hours as needed for Pain.  Qty: 15 tablet, Refills: 0      tamsulosin (FLOMAX) 0.4 mg Cap Take 1 capsule (0.4 mg total) by mouth once daily.  Qty: 20 capsule, Refills: 0      VENTOLIN HFA 90 mcg/actuation inhaler INHALE TWO TO FOUR PUFFS 30 minutes prior TO exercise  Refills: 2           Discharge Procedure Orders   Call MD for:  persistent nausea and vomiting or diarrhea     Call MD for:  severe uncontrolled pain     Call MD for:  persistent  dizziness, light-headedness, or visual disturbances     Call MD for:   Order Comments: Temperature > 101F     Follow-up Information     Mian Childs MD In 6 weeks.    Specialty:  Urology  Contact information:  [address]  [facility] [address] [phone]                   Discharge Procedure Orders (must include Diet, Follow-up, Activity):   Discharge Procedure Orders (must include Diet, Follow-up, Activity)   Call MD for:  persistent nausea and vomiting or diarrhea     Call MD for:  severe uncontrolled pain     Call MD for:  persistent dizziness, light-headedness, or visual disturbances     Call MD for:   Order Comments: Temperature > 101F

## 2018-09-14 NOTE — ANESTHESIA PREPROCEDURE EVALUATION
No past medical history on file.  No past surgical history on file.  Patient Active Problem List   Diagnosis    Kidney stones    Urolithiasis     There is no height or weight on file to calculate BMI.  2D Echo:  No results found for this or any previous visit.    Please See ROS/PMH and Active Problem List above                                                                                                                09/14/2018  Jeremiah Medina is a 18 y.o., female.    Anesthesia Evaluation    I have reviewed the Patient Summary Reports.    I have reviewed the Nursing Notes.   I have reviewed the Medications.     Review of Systems  Anesthesia Hx:  No problems with previous Anesthesia    Hematology/Oncology:  Hematology Normal   Oncology Normal     Cardiovascular:   Exercise tolerance: good    Pulmonary:   Asthma asymptomatic    Renal/:   renal calculi    Musculoskeletal:  Musculoskeletal Normal    Neurological:  Neurology Normal    Endocrine:  Endocrine Normal    Dermatological:  Skin Normal        Physical Exam  General:  Well nourished    Airway/Jaw/Neck:  Airway Findings: Mouth Opening: Normal Tongue: Normal  General Airway Assessment: Adult  Mallampati: I  TM Distance: 4 - 6 cm  Jaw/Neck Findings:  Neck ROM: Normal ROM     Eyes/Ears/Nose:  Eyes/Ears/Nose Findings:    Dental:  Dental Findings: In tact   Chest/Lungs:  Chest/Lungs Findings: Clear to auscultation, Normal Respiratory Rate     Heart/Vascular:  Heart Findings: Rate: Normal  Rhythm: Regular Rhythm        Mental Status:  Mental Status Findings:  Cooperative, Alert and Oriented         Anesthesia Plan  Type of Anesthesia, risks & benefits discussed:  Anesthesia Type:  general  Patient's Preference: gen  Intra-op Monitoring Plan:   Intra-op Monitoring Plan Comments:   Post Op Pain Control Plan:   Post Op Pain Control Plan Comments: Iv>po  Induction:   IV  Beta Blocker:  Patient is not currently on a Beta-Blocker (No further documentation required).        Informed Consent: Patient understands risks and agrees with Anesthesia plan.  Questions answered. Anesthesia consent signed with patient.  ASA Score: 2     Day of Surgery Review of History & Physical:    H&P update referred to the surgeon.         Ready For Surgery From Anesthesia Perspective.

## 2018-09-17 NOTE — ANESTHESIA POSTPROCEDURE EVALUATION
"Anesthesia Post Evaluation    Patient: Jeremiah Medina    Procedure(s) Performed: Procedure(s) (LRB):  REMOVAL, CALCULUS, URETER, URETEROSCOPIC (Right)  CYSTOSCOPY, WITH URETERAL STENT INSERTION (Right)  CYSTOSCOPY, WITH RETROGRADE PYELOGRAM (Right)    Final Anesthesia Type: general  Patient location during evaluation: PACU  Patient participation: Yes- Able to Participate  Level of consciousness: awake and alert and oriented  Post-procedure vital signs: reviewed and stable  Pain management: adequate  Airway patency: patent  PONV status at discharge: No PONV  Anesthetic complications: no      Cardiovascular status: hemodynamically stable  Respiratory status: unassisted, spontaneous ventilation and room air  Hydration status: euvolemic  Follow-up not needed.        Visit Vitals  BP (!) 143/89 (BP Location: Left arm, Patient Position: Sitting)   Pulse 62   Temp 36.7 °C (98.1 °F) (Temporal)   Resp 17   Ht 5' 4" (1.626 m)   Wt 82.6 kg (182 lb)   LMP 09/14/2018   SpO2 99%   Breastfeeding? No   BMI 31.24 kg/m²       Pain/Eduardo Score: No Data Recorded      "

## 2018-09-19 LAB
ANNOTATION COMMENT IMP: NORMAL
COMPN STONE: NORMAL
SPECIMEN SOURCE: NORMAL
STONE ANALYSIS IR-IMP: NORMAL

## 2018-09-22 ENCOUNTER — TELEPHONE (OUTPATIENT)
Dept: UROLOGY | Facility: HOSPITAL | Age: 18
End: 2018-09-22

## 2018-09-22 ENCOUNTER — HOSPITAL ENCOUNTER (INPATIENT)
Facility: HOSPITAL | Age: 18
LOS: 4 days | Discharge: HOME | DRG: 872 | End: 2018-09-27
Attending: FAMILY MEDICINE | Admitting: INTERNAL MEDICINE
Payer: COMMERCIAL

## 2018-09-22 ENCOUNTER — HOSPITAL ENCOUNTER (EMERGENCY)
Facility: HOSPITAL | Age: 18
Discharge: HOME | End: 2018-09-22
Attending: EMERGENCY MEDICINE
Payer: COMMERCIAL

## 2018-09-22 ENCOUNTER — NURSE TRIAGE (OUTPATIENT)
Dept: ADMINISTRATIVE | Facility: CLINIC | Age: 18
End: 2018-09-22

## 2018-09-22 VITALS
RESPIRATION RATE: 20 BRPM | WEIGHT: 175 LBS | DIASTOLIC BLOOD PRESSURE: 78 MMHG | HEART RATE: 109 BPM | SYSTOLIC BLOOD PRESSURE: 107 MMHG | OXYGEN SATURATION: 99 % | BODY MASS INDEX: 29.88 KG/M2 | HEIGHT: 64 IN | TEMPERATURE: 99 F

## 2018-09-22 DIAGNOSIS — N12 PYELONEPHRITIS: Primary | ICD-10-CM

## 2018-09-22 DIAGNOSIS — I95.9 HYPOTENSION, UNSPECIFIED HYPOTENSION TYPE: ICD-10-CM

## 2018-09-22 LAB
ANION GAP SERPL CALC-SCNC: 14 MMOL/L
B-HCG UR QL: NEGATIVE
BACTERIA #/AREA URNS AUTO: ABNORMAL /HPF
BASOPHILS # BLD AUTO: 0.07 K/UL
BASOPHILS NFR BLD: 0.3 %
BILIRUB UR QL STRIP: NEGATIVE
BUN SERPL-MCNC: 15 MG/DL
CALCIUM SERPL-MCNC: 9.5 MG/DL
CHLORIDE SERPL-SCNC: 106 MMOL/L
CLARITY UR REFRACT.AUTO: ABNORMAL
CO2 SERPL-SCNC: 18 MMOL/L
COLOR UR AUTO: ABNORMAL
CREAT SERPL-MCNC: 1.1 MG/DL
CTP QC/QA: YES
DIFFERENTIAL METHOD: ABNORMAL
EOSINOPHIL # BLD AUTO: 0 K/UL
EOSINOPHIL NFR BLD: 0 %
ERYTHROCYTE [DISTWIDTH] IN BLOOD BY AUTOMATED COUNT: 13.9 %
EST. GFR  (AFRICAN AMERICAN): >60 ML/MIN/1.73 M^2
EST. GFR  (NON AFRICAN AMERICAN): >60 ML/MIN/1.73 M^2
GLUCOSE SERPL-MCNC: 125 MG/DL
GLUCOSE UR QL STRIP: NEGATIVE
HCT VFR BLD AUTO: 41.4 %
HGB BLD-MCNC: 13.7 G/DL
HGB UR QL STRIP: ABNORMAL
HYALINE CASTS UR QL AUTO: 0 /LPF
IMM GRANULOCYTES # BLD AUTO: 0.14 K/UL
IMM GRANULOCYTES NFR BLD AUTO: 0.6 %
KETONES UR QL STRIP: ABNORMAL
LEUKOCYTE ESTERASE UR QL STRIP: ABNORMAL
LYMPHOCYTES # BLD AUTO: 0.8 K/UL
LYMPHOCYTES NFR BLD: 3.4 %
MCH RBC QN AUTO: 29.2 PG
MCHC RBC AUTO-ENTMCNC: 33.1 G/DL
MCV RBC AUTO: 88 FL
MICROSCOPIC COMMENT: ABNORMAL
MONOCYTES # BLD AUTO: 1.7 K/UL
MONOCYTES NFR BLD: 7 %
NEUTROPHILS # BLD AUTO: 22.2 K/UL
NEUTROPHILS NFR BLD: 88.7 %
NITRITE UR QL STRIP: NEGATIVE
NRBC BLD-RTO: 0 /100 WBC
PH UR STRIP: 5 [PH] (ref 5–8)
PLATELET # BLD AUTO: 380 K/UL
PMV BLD AUTO: 10.7 FL
POTASSIUM SERPL-SCNC: 3.4 MMOL/L
PROT UR QL STRIP: ABNORMAL
RBC # BLD AUTO: 4.69 M/UL
RBC #/AREA URNS AUTO: 43 /HPF (ref 0–4)
SODIUM SERPL-SCNC: 138 MMOL/L
SP GR UR STRIP: 1.02 (ref 1–1.03)
SQUAMOUS #/AREA URNS AUTO: 12 /HPF
URN SPEC COLLECT METH UR: ABNORMAL
UROBILINOGEN UR STRIP-ACNC: 2 EU/DL
WBC # BLD AUTO: 25.02 K/UL
WBC #/AREA URNS AUTO: 64 /HPF (ref 0–5)

## 2018-09-22 PROCEDURE — 96375 TX/PRO/DX INJ NEW DRUG ADDON: CPT

## 2018-09-22 PROCEDURE — 96361 HYDRATE IV INFUSION ADD-ON: CPT

## 2018-09-22 PROCEDURE — 63600175 PHARM REV CODE 636 W HCPCS: Performed by: EMERGENCY MEDICINE

## 2018-09-22 PROCEDURE — 99285 EMERGENCY DEPT VISIT HI MDM: CPT | Mod: 25

## 2018-09-22 PROCEDURE — 87088 URINE BACTERIA CULTURE: CPT

## 2018-09-22 PROCEDURE — 80048 BASIC METABOLIC PNL TOTAL CA: CPT

## 2018-09-22 PROCEDURE — 25000003 PHARM REV CODE 250: Performed by: EMERGENCY MEDICINE

## 2018-09-22 PROCEDURE — 87086 URINE CULTURE/COLONY COUNT: CPT

## 2018-09-22 PROCEDURE — 81001 URINALYSIS AUTO W/SCOPE: CPT

## 2018-09-22 PROCEDURE — 96365 THER/PROPH/DIAG IV INF INIT: CPT

## 2018-09-22 PROCEDURE — 87186 SC STD MICRODIL/AGAR DIL: CPT

## 2018-09-22 PROCEDURE — 96374 THER/PROPH/DIAG INJ IV PUSH: CPT

## 2018-09-22 PROCEDURE — 81025 URINE PREGNANCY TEST: CPT | Performed by: EMERGENCY MEDICINE

## 2018-09-22 PROCEDURE — 99284 EMERGENCY DEPT VISIT MOD MDM: CPT | Mod: ,,, | Performed by: EMERGENCY MEDICINE

## 2018-09-22 PROCEDURE — 99284 EMERGENCY DEPT VISIT MOD MDM: CPT | Mod: 25,27

## 2018-09-22 PROCEDURE — 85025 COMPLETE CBC W/AUTO DIFF WBC: CPT

## 2018-09-22 PROCEDURE — 87077 CULTURE AEROBIC IDENTIFY: CPT

## 2018-09-22 PROCEDURE — 93005 ELECTROCARDIOGRAM TRACING: CPT

## 2018-09-22 RX ORDER — ACETAMINOPHEN 500 MG
1000 TABLET ORAL
Status: DISCONTINUED | OUTPATIENT
Start: 2018-09-22 | End: 2018-09-22

## 2018-09-22 RX ORDER — CEFTRIAXONE 1 G/1
1 INJECTION, POWDER, FOR SOLUTION INTRAMUSCULAR; INTRAVENOUS
Status: DISCONTINUED | OUTPATIENT
Start: 2018-09-22 | End: 2018-09-22

## 2018-09-22 RX ORDER — ACETAMINOPHEN 500 MG
500 TABLET ORAL EVERY 6 HOURS PRN
COMMUNITY

## 2018-09-22 RX ORDER — CEFTRIAXONE 1 G/1
1 INJECTION, POWDER, FOR SOLUTION INTRAMUSCULAR; INTRAVENOUS
Status: COMPLETED | OUTPATIENT
Start: 2018-09-22 | End: 2018-09-22

## 2018-09-22 RX ORDER — SULFAMETHOXAZOLE AND TRIMETHOPRIM 800; 160 MG/1; MG/1
1 TABLET ORAL 2 TIMES DAILY
Qty: 14 TABLET | Refills: 0 | Status: ON HOLD | OUTPATIENT
Start: 2018-09-22 | End: 2018-09-27 | Stop reason: HOSPADM

## 2018-09-22 RX ADMIN — SODIUM CHLORIDE 1000 ML: 0.9 INJECTION, SOLUTION INTRAVENOUS at 02:09

## 2018-09-22 RX ADMIN — CEFTRIAXONE SODIUM 1 G: 1 INJECTION, POWDER, FOR SOLUTION INTRAMUSCULAR; INTRAVENOUS at 03:09

## 2018-09-22 NOTE — ED TRIAGE NOTES
Patient arrives to ED with CC of fever, onset last night. Patient denies SOB, nausea and vomiting. No other complaints at this time.     Patient identifiers verified and correct for Jeremiah Medina.    LOC: The patient is awake, alert and oriented x 4. Pt is speaking appropriately, no slurred speech.  APPEARANCE: Patient resting comfortably and in no acute distress. Pt is clean and well groomed. No JVD visible. Pt reports pain level of 0.  SKIN: Skin is warm, dry, and color is consistent with ethnicity. No tenting observed and capillary refill <3 seconds. No clubbing noted to nail beds. No breakdown or brusing visible and mucus membranes moist and acyanotic.  MUSCULOSKELETAL: Full range of motion present in all extremities. Hand  equal and leg strength strong +5 bilaterally.  RESPIRATORY: Airway is open and patent. Respirations-unlabored, regular rate, equal bilaterally on inspiration and expiration. No accessory muscle use noted.   CARDIAC: No peripheral edema noted, and patient has no c/o chest pain.  ABDOMEN: Soft and non-tender to palpation with no distention noted. Pt has no complaints of abnormal bowel movements. Pt reports normal appetite.   NEUROLOGIC: Eyes open spontaneously and facial expression symmetrical. Pt behavior appropriate to situation, and pt follows commands.  Pt reports sensation present in all extremities when touched with a finger. PERRLA.  : No complaints of frequency, burning, urgency or blood in the urine.

## 2018-09-22 NOTE — ED NOTES
Hourly rounding complete. Patient resting comfortably in stretcher. Patient awake and alert, VSS, respirations even and unlabored. No acute distress noted. Updated on plan of care. Rates headache 6/10. Bed in lowest position, locked, side rails up x 2, and call bell in reach.

## 2018-09-22 NOTE — ED PROVIDER NOTES
Encounter Date: 9/22/2018    SCRIBE #1 NOTE: I, Sunny Renee, am scribing for, and in the presence of,  Dr. uFlton. I have scribed the entire note.       History     Chief Complaint   Patient presents with    Fever     Pt arrives for evaluation of fever since last night up to 104.8 today at 1300 - states stents placed to right kidney on 9.14.2018 and stents removed by patient on 9.19.2018 - states fever started last night - patient complains of pain to right side - denies urinary symptoms - patient has flushed skin     The history is provided by the patient.   Fever   Primary symptoms of the febrile illness include fever. Primary symptoms do not include abdominal pain, vomiting or dysuria. The current episode started today. This is a new problem. The problem has been gradually improving (POST TYLENOL).   The maximum temperature recorded prior to her arrival was more than 104 F.     Review of patient's allergies indicates:  No Known Allergies  Past Medical History:   Diagnosis Date    Asthma     exercise induced     Past Surgical History:   Procedure Laterality Date    CYSTOSCOPY W/ RETROGRADES Right 9/14/2018    Procedure: CYSTOSCOPY, WITH RETROGRADE PYELOGRAM;  Surgeon: Mian Childs MD;  Location: [facility] OR [address] [place] [address];  Service: Urology;  Laterality: Right;    CYSTOSCOPY W/ URETERAL STENT PLACEMENT Right 9/14/2018    Procedure: CYSTOSCOPY, WITH URETERAL STENT INSERTION;  Surgeon: Mian Childs MD;  Location: [facility] OR [address] [place] [address];  Service: Urology;  Laterality: Right;    CYSTOSCOPY, WITH RETROGRADE PYELOGRAM Right 9/14/2018    Performed by Mian Childs MD at [facility] OR [address] [place] [address]    CYSTOSCOPY, WITH URETERAL STENT INSERTION Right 9/14/2018    Performed by Mian Childs MD at [facility] OR [address] [place] [address]    REMOVAL, CALCULUS, URETER, URETEROSCOPIC Right 9/14/2018    Performed by Mian Childs MD at [facility] OR [address] [place] [address]    URETEROSCOPIC REMOVAL OF URETERIC CALCULUS Right 9/14/2018    Procedure: REMOVAL, CALCULUS,  URETER, URETEROSCOPIC;  Surgeon: Mian Childs MD;  Location: [facility] OR [address] [place] [address];  Service: Urology;  Laterality: Right;  1.5 hours    WISDOM TOOTH EXTRACTION       Family History   Problem Relation Age of Onset    Diabetes Maternal Grandmother      Social History     Tobacco Use    Smoking status: Never Smoker    Smokeless tobacco: Never Used   Substance Use Topics    Alcohol use: No     Frequency: Never    Drug use: No     Review of Systems   Constitutional: Positive for fever.   Gastrointestinal: Negative for abdominal pain and vomiting.   Genitourinary: Negative for dysuria, flank pain, frequency and vaginal discharge.   Musculoskeletal: Negative for back pain.   All other systems reviewed and are negative.      Physical Exam     Initial Vitals [09/22/18 1338]   BP Pulse Resp Temp SpO2   135/60 (!) 134 20 (!) 102.8 °F (39.3 °C) 95 %      MAP       --         Physical Exam    Vitals reviewed.  Constitutional: She appears well-developed and well-nourished. No distress.   NON TOXIC   HENT:   Head: Atraumatic.   Right Ear: External ear normal.   Left Ear: External ear normal.   Mouth/Throat: Oropharynx is clear and moist.   TMS EQUAL   Eyes: Conjunctivae and EOM are normal.   Neck: Normal range of motion. Neck supple. No tracheal deviation present. No JVD present.   NO MENINGEAL SIGNS   Cardiovascular:   TACHYCARDIC/FEVER   Pulmonary/Chest: Breath sounds normal. No respiratory distress.   Abdominal: Soft. Bowel sounds are normal. There is no tenderness. There is no rebound and no guarding.   NO CVA TENDERNESS   Musculoskeletal: Normal range of motion.   Neurological: She is alert and oriented to person, place, and time.   Skin: Skin is warm and dry.   Psychiatric: She has a normal mood and affect.         ED Course   Procedures  Labs Reviewed   URINALYSIS          Imaging Results    None          Medical Decision Making:   Clinical Tests:   Lab Tests: Reviewed  Radiological Study: Reviewed  ED  Management:  TOOK TYLENOL PRIOR TO ARRIVAL   MENSES 09/14/2018  1510 FEELS BETTER, TOLERATING P.O.  1630HEMODYNAMICALLY STABLE NO SIGNS OFPID OR ACUTE SURGICAL ABDOMEN  NO NEW COMPLAINTS, SINUS 88  1645 UPDATED FAMILY BEDSIDE, ACTING NORMAL PER FAMILY            Scribe Attestation:   Scribe #1: I performed the above scribed service and the documentation accurately describes the services I performed. I attest to the accuracy of the note.               Clinical Impression:   FEVER  PYELONEPHRITIS        Disposition:   Disposition: Discharged  Condition: Stable                        Altaf Fulton, DO  09/22/18 1629       Altaf Fulton, DO  09/22/18 1636       Altaf Fulton, DO  09/22/18 1651       Altaf Fulton, DO  09/22/18 1700

## 2018-09-23 PROBLEM — N12 PYELONEPHRITIS: Status: ACTIVE | Noted: 2018-09-23

## 2018-09-23 PROBLEM — A41.9 SEVERE SEPSIS: Status: ACTIVE | Noted: 2018-09-23

## 2018-09-23 PROBLEM — N10 ACUTE PYELONEPHRITIS: Status: ACTIVE | Noted: 2018-09-23

## 2018-09-23 PROBLEM — R65.20 SEVERE SEPSIS: Status: ACTIVE | Noted: 2018-09-23

## 2018-09-23 LAB
ALBUMIN SERPL BCP-MCNC: 3.6 G/DL
ALP SERPL-CCNC: 63 U/L
ALT SERPL W/O P-5'-P-CCNC: 13 U/L
ANION GAP SERPL CALC-SCNC: 15 MMOL/L
AST SERPL-CCNC: 16 U/L
B-HCG UR QL: NEGATIVE
BACTERIA #/AREA URNS HPF: ABNORMAL /HPF
BASOPHILS # BLD AUTO: ABNORMAL K/UL
BASOPHILS NFR BLD: 0 %
BILIRUB SERPL-MCNC: 0.8 MG/DL
BILIRUB UR QL STRIP: ABNORMAL
BUN SERPL-MCNC: 13 MG/DL
CALCIUM SERPL-MCNC: 9.1 MG/DL
CHLORIDE SERPL-SCNC: 103 MMOL/L
CLARITY UR: CLEAR
CO2 SERPL-SCNC: 20 MMOL/L
COLOR UR: YELLOW
CREAT SERPL-MCNC: 1 MG/DL
DIFFERENTIAL METHOD: ABNORMAL
EOSINOPHIL # BLD AUTO: ABNORMAL K/UL
EOSINOPHIL NFR BLD: 0 %
ERYTHROCYTE [DISTWIDTH] IN BLOOD BY AUTOMATED COUNT: 14.7 %
EST. GFR  (AFRICAN AMERICAN): >60 ML/MIN/1.73 M^2
EST. GFR  (NON AFRICAN AMERICAN): >60 ML/MIN/1.73 M^2
GLUCOSE SERPL-MCNC: 145 MG/DL
GLUCOSE UR QL STRIP: NEGATIVE
HCT VFR BLD AUTO: 37 %
HGB BLD-MCNC: 12.4 G/DL
HGB UR QL STRIP: ABNORMAL
HYALINE CASTS #/AREA URNS LPF: 0 /LPF
KETONES UR QL STRIP: ABNORMAL
LACTATE SERPL-SCNC: 1.5 MMOL/L
LACTATE SERPL-SCNC: 2.2 MMOL/L
LEUKOCYTE ESTERASE UR QL STRIP: ABNORMAL
LYMPHOCYTES # BLD AUTO: ABNORMAL K/UL
LYMPHOCYTES NFR BLD: 5 %
MCH RBC QN AUTO: 29.7 PG
MCHC RBC AUTO-ENTMCNC: 33.5 G/DL
MCV RBC AUTO: 89 FL
METAMYELOCYTES NFR BLD MANUAL: 3 %
MICROSCOPIC COMMENT: ABNORMAL
MONOCYTES # BLD AUTO: ABNORMAL K/UL
MONOCYTES NFR BLD: 4 %
NEUTROPHILS NFR BLD: 63 %
NEUTS BAND NFR BLD MANUAL: 25 %
NITRITE UR QL STRIP: NEGATIVE
PH UR STRIP: 6 [PH] (ref 5–8)
PLATELET # BLD AUTO: 255 K/UL
PLATELET BLD QL SMEAR: ABNORMAL
PMV BLD AUTO: 10.2 FL
POTASSIUM SERPL-SCNC: 3.3 MMOL/L
PROCALCITONIN SERPL IA-MCNC: 5.54 NG/ML
PROT SERPL-MCNC: 7 G/DL
PROT UR QL STRIP: ABNORMAL
RBC # BLD AUTO: 4.18 M/UL
RBC #/AREA URNS HPF: 15 /HPF (ref 0–4)
SODIUM SERPL-SCNC: 138 MMOL/L
SP GR UR STRIP: >=1.03 (ref 1–1.03)
SQUAMOUS #/AREA URNS HPF: 10 /HPF
URN SPEC COLLECT METH UR: ABNORMAL
UROBILINOGEN UR STRIP-ACNC: NEGATIVE EU/DL
WBC # BLD AUTO: 27.37 K/UL
WBC #/AREA URNS HPF: 55 /HPF (ref 0–5)

## 2018-09-23 PROCEDURE — 63600175 PHARM REV CODE 636 W HCPCS: Performed by: FAMILY MEDICINE

## 2018-09-23 PROCEDURE — 25000003 PHARM REV CODE 250: Performed by: FAMILY MEDICINE

## 2018-09-23 PROCEDURE — 81025 URINE PREGNANCY TEST: CPT

## 2018-09-23 PROCEDURE — 81000 URINALYSIS NONAUTO W/SCOPE: CPT

## 2018-09-23 PROCEDURE — 36415 COLL VENOUS BLD VENIPUNCTURE: CPT

## 2018-09-23 PROCEDURE — 87077 CULTURE AEROBIC IDENTIFY: CPT

## 2018-09-23 PROCEDURE — 87186 SC STD MICRODIL/AGAR DIL: CPT

## 2018-09-23 PROCEDURE — 83605 ASSAY OF LACTIC ACID: CPT | Mod: 91

## 2018-09-23 PROCEDURE — 11000001 HC ACUTE MED/SURG PRIVATE ROOM

## 2018-09-23 PROCEDURE — 25000003 PHARM REV CODE 250: Performed by: INTERNAL MEDICINE

## 2018-09-23 PROCEDURE — 84145 PROCALCITONIN (PCT): CPT

## 2018-09-23 PROCEDURE — 63600175 PHARM REV CODE 636 W HCPCS: Performed by: INTERNAL MEDICINE

## 2018-09-23 PROCEDURE — 85027 COMPLETE CBC AUTOMATED: CPT

## 2018-09-23 PROCEDURE — 87088 URINE BACTERIA CULTURE: CPT

## 2018-09-23 PROCEDURE — 87040 BLOOD CULTURE FOR BACTERIA: CPT | Mod: 59

## 2018-09-23 PROCEDURE — 21400001 HC TELEMETRY ROOM

## 2018-09-23 PROCEDURE — 87086 URINE CULTURE/COLONY COUNT: CPT

## 2018-09-23 PROCEDURE — 80053 COMPREHEN METABOLIC PANEL: CPT

## 2018-09-23 PROCEDURE — 85007 BL SMEAR W/DIFF WBC COUNT: CPT

## 2018-09-23 RX ORDER — ACETAMINOPHEN 325 MG/1
650 TABLET ORAL EVERY 6 HOURS PRN
Status: DISCONTINUED | OUTPATIENT
Start: 2018-09-23 | End: 2018-09-27 | Stop reason: HOSPADM

## 2018-09-23 RX ORDER — MORPHINE SULFATE 4 MG/ML
4 INJECTION, SOLUTION INTRAMUSCULAR; INTRAVENOUS
Status: COMPLETED | OUTPATIENT
Start: 2018-09-23 | End: 2018-09-23

## 2018-09-23 RX ORDER — KETOROLAC TROMETHAMINE 30 MG/ML
30 INJECTION, SOLUTION INTRAMUSCULAR; INTRAVENOUS ONCE
Status: COMPLETED | OUTPATIENT
Start: 2018-09-23 | End: 2018-09-23

## 2018-09-23 RX ORDER — SODIUM CHLORIDE 9 MG/ML
1000 INJECTION, SOLUTION INTRAVENOUS CONTINUOUS
Status: DISCONTINUED | OUTPATIENT
Start: 2018-09-23 | End: 2018-09-23

## 2018-09-23 RX ORDER — ONDANSETRON 2 MG/ML
4 INJECTION INTRAMUSCULAR; INTRAVENOUS
Status: COMPLETED | OUTPATIENT
Start: 2018-09-23 | End: 2018-09-23

## 2018-09-23 RX ORDER — SODIUM CHLORIDE 9 MG/ML
1000 INJECTION, SOLUTION INTRAVENOUS CONTINUOUS
Status: DISCONTINUED | OUTPATIENT
Start: 2018-09-23 | End: 2018-09-24

## 2018-09-23 RX ORDER — POTASSIUM CHLORIDE 20 MEQ/1
40 TABLET, EXTENDED RELEASE ORAL ONCE
Status: COMPLETED | OUTPATIENT
Start: 2018-09-23 | End: 2018-09-23

## 2018-09-23 RX ORDER — VANCOMYCIN HCL IN 5 % DEXTROSE 1G/250ML
15 PLASTIC BAG, INJECTION (ML) INTRAVENOUS
Status: DISCONTINUED | OUTPATIENT
Start: 2018-09-23 | End: 2018-09-24

## 2018-09-23 RX ORDER — MAG HYDROX/ALUMINUM HYD/SIMETH 200-200-20
30 SUSPENSION, ORAL (FINAL DOSE FORM) ORAL EVERY 6 HOURS PRN
Status: DISCONTINUED | OUTPATIENT
Start: 2018-09-23 | End: 2018-09-27 | Stop reason: HOSPADM

## 2018-09-23 RX ORDER — OXYCODONE AND ACETAMINOPHEN 5; 325 MG/1; MG/1
1 TABLET ORAL EVERY 4 HOURS PRN
Status: DISCONTINUED | OUTPATIENT
Start: 2018-09-23 | End: 2018-09-24

## 2018-09-23 RX ORDER — ONDANSETRON 2 MG/ML
4 INJECTION INTRAMUSCULAR; INTRAVENOUS EVERY 8 HOURS PRN
Status: DISCONTINUED | OUTPATIENT
Start: 2018-09-23 | End: 2018-09-27 | Stop reason: HOSPADM

## 2018-09-23 RX ORDER — IBUPROFEN 400 MG/1
400 TABLET ORAL EVERY 6 HOURS PRN
Status: DISCONTINUED | OUTPATIENT
Start: 2018-09-23 | End: 2018-09-24

## 2018-09-23 RX ORDER — FAMOTIDINE 20 MG/1
20 TABLET, FILM COATED ORAL 2 TIMES DAILY
Status: DISCONTINUED | OUTPATIENT
Start: 2018-09-23 | End: 2018-09-27 | Stop reason: HOSPADM

## 2018-09-23 RX ORDER — IPRATROPIUM BROMIDE AND ALBUTEROL SULFATE 2.5; .5 MG/3ML; MG/3ML
3 SOLUTION RESPIRATORY (INHALATION) EVERY 4 HOURS PRN
Status: DISCONTINUED | OUTPATIENT
Start: 2018-09-23 | End: 2018-09-25 | Stop reason: SDUPTHER

## 2018-09-23 RX ORDER — GUAIFENESIN 100 MG/5ML
200 SOLUTION ORAL EVERY 4 HOURS PRN
Status: DISCONTINUED | OUTPATIENT
Start: 2018-09-23 | End: 2018-09-27 | Stop reason: HOSPADM

## 2018-09-23 RX ORDER — OXYCODONE AND ACETAMINOPHEN 10; 325 MG/1; MG/1
1 TABLET ORAL EVERY 4 HOURS PRN
Status: DISCONTINUED | OUTPATIENT
Start: 2018-09-23 | End: 2018-09-24

## 2018-09-23 RX ADMIN — IBUPROFEN 400 MG: 400 TABLET, FILM COATED ORAL at 12:09

## 2018-09-23 RX ADMIN — OXYCODONE HYDROCHLORIDE AND ACETAMINOPHEN 1 TABLET: 5; 325 TABLET ORAL at 05:09

## 2018-09-23 RX ADMIN — SODIUM CHLORIDE 500 ML: 0.9 INJECTION, SOLUTION INTRAVENOUS at 03:09

## 2018-09-23 RX ADMIN — SODIUM CHLORIDE 1000 ML: 0.9 INJECTION, SOLUTION INTRAVENOUS at 12:09

## 2018-09-23 RX ADMIN — PIPERACILLIN AND TAZOBACTAM 4.5 G: 4; .5 INJECTION, POWDER, LYOPHILIZED, FOR SOLUTION INTRAVENOUS; PARENTERAL at 02:09

## 2018-09-23 RX ADMIN — MORPHINE SULFATE 4 MG: 4 INJECTION INTRAVENOUS at 12:09

## 2018-09-23 RX ADMIN — KETOROLAC TROMETHAMINE 30 MG: 30 INJECTION, SOLUTION INTRAMUSCULAR at 01:09

## 2018-09-23 RX ADMIN — OXYCODONE HYDROCHLORIDE AND ACETAMINOPHEN 1 TABLET: 10; 325 TABLET ORAL at 11:09

## 2018-09-23 RX ADMIN — SODIUM CHLORIDE 1000 ML: 0.9 INJECTION, SOLUTION INTRAVENOUS at 05:09

## 2018-09-23 RX ADMIN — PIPERACILLIN AND TAZOBACTAM 4.5 G: 4; .5 INJECTION, POWDER, LYOPHILIZED, FOR SOLUTION INTRAVENOUS; PARENTERAL at 10:09

## 2018-09-23 RX ADMIN — VANCOMYCIN HYDROCHLORIDE 1000 MG: 1 INJECTION, POWDER, LYOPHILIZED, FOR SOLUTION INTRAVENOUS at 08:09

## 2018-09-23 RX ADMIN — ACETAMINOPHEN 650 MG: 325 TABLET, FILM COATED ORAL at 08:09

## 2018-09-23 RX ADMIN — ONDANSETRON 4 MG: 2 INJECTION INTRAMUSCULAR; INTRAVENOUS at 12:09

## 2018-09-23 RX ADMIN — SODIUM CHLORIDE 2352 ML: 0.9 INJECTION, SOLUTION INTRAVENOUS at 02:09

## 2018-09-23 RX ADMIN — SODIUM CHLORIDE 1000 ML: 0.9 INJECTION, SOLUTION INTRAVENOUS at 10:09

## 2018-09-23 RX ADMIN — POTASSIUM CHLORIDE 40 MEQ: 1500 TABLET, EXTENDED RELEASE ORAL at 05:09

## 2018-09-23 RX ADMIN — ACETAMINOPHEN 650 MG: 325 TABLET, FILM COATED ORAL at 12:09

## 2018-09-23 RX ADMIN — PIPERACILLIN AND TAZOBACTAM 4.5 G: 4; .5 INJECTION, POWDER, LYOPHILIZED, FOR SOLUTION INTRAVENOUS; PARENTERAL at 05:09

## 2018-09-23 RX ADMIN — FAMOTIDINE 20 MG: 20 TABLET ORAL at 08:09

## 2018-09-23 RX ADMIN — VANCOMYCIN HYDROCHLORIDE 1250 MG: 10 INJECTION, POWDER, LYOPHILIZED, FOR SOLUTION INTRAVENOUS at 07:09

## 2018-09-23 RX ADMIN — OXYCODONE HYDROCHLORIDE AND ACETAMINOPHEN 1 TABLET: 5; 325 TABLET ORAL at 09:09

## 2018-09-23 NOTE — TELEPHONE ENCOUNTER
Patient's mother called. The patient was seen in the ED earlier today for fevers up to 104, right flank pain, headaches and neck stiffness. She was tachycardic and WBC count was 25. UA showed moderate bacteria. Her fever was down to 100.2. She was given rocephin and discharged from the ED with bactrim. Since then she has continued to feel ill, and fevers have returned up to 102 most recently. She has not been able to take in fluids by mouth. She has continued to have headaches and neck stiffness. I instructed them to return to the ED as the patient needs further evaluation with renal imaging and may require IVF and IV antibiotics.

## 2018-09-23 NOTE — HPI
Ms. Medina is a young 18-year-old  female student with no significant medical problems, otherwise healthy, presents to the [facility] ED today (9/23/18) complaining of fever, and right flank pain. Patient had history of nephrolithiasis, had a right ureteral stent placed on 9/14/18, the stent was taken out on 9/19/18 by Dr. Childs at [facility].  Yesterday patient presented to the [place] ED complaining of fever, she was given one dose of IV Rocephin and discharged from the ED with oral Bactrim.  Patient came to visit her mother in [place], symptoms got progressively worse, fever of 102.8, nausea, vomiting, right flank pain.    She is found to be in severe sepsis with temperature 102.8°, heart rate > 90, WBC 25,000, with 25% bands, lactic acid 2.2, procalcitonin 5.5.  Initial blood pressure 81/51, MAP 61.  Patient received 30 mL/kilogram normal saline bolus in the ED, with improvement of blood pressure to 99/58, MAP 75.  Blood and urine cultures were obtained.  Started on IV Zosyn and vancomycin empirically. Preliminary reading of CT abdomen pelvis without contrast  reveals no renal calculus. No hydronephrosis. Subtle right perinephric stranding consistent with pyelonephritis.

## 2018-09-23 NOTE — PROGRESS NOTES
Vancomycin Consult Note     Pharmacy consulted to dose vancomycin by Dr. Paulino  Pt is a 19 y/o female with PMH of nephrolithiasis who had a R ureteral stent placed on 9/14 then removed on 9/19    Indication: pyelonephritis (seen on CT) with sepsis   WBC = 27.37 (up from 25.02)  Tmax = 104.1  Blood & urine cultures pending    IBW = 54.7 kg  ABW = 82.1 kg  Adj wt = 65.7 kg --> use for dosing weight  SCr = 1.0 (down from 1.1)    Pt received a 1250 mg loading dose in ER @ 0704 this morning  She will be continued on a dose of 1gm every 12 hours  Trough due tomorrow 9/24 @ 1800 before 4th total dose  Goal trough: 15-20 mcg/ml    Thank you for allowing us to participate in this patient's care.   Katherine McArdle, Pharm.D. 9/23/2018 2:35 PM

## 2018-09-23 NOTE — PROGRESS NOTES
Pt was seen and examined at bedside . 19 y/o wf admitted  With a dx of severe sepsis 2/2/ to pyelonephritis . Patient had history of nephrolithiasis, had a right ureteral stent placed on 9/14/18, the stent was taken out on 9/19/18 by Dr. Childs at [facility]. CT scan from yesterday the prelaminar result show pyelonephritis . Pt was started on IV vanc and zosyn .  She had 30 cc/kg IVF  Per sepsis protocol . The blood pressure is borderline low  With a MAP > 65 . The  Lactic acid  Was 2.2 (POA) and procalcitonin level elevated (POA).    PE: aao x 3   In nad .  Lung ctab . Hear rrr , nl s1/s2 . Abdomen soft nd/nt . (+) rigt side CVA tenderness    Cardiovascular Exam: Normal rate, regular rhythm and normal pulses. No murmur heard.     Pulmonary Exam: No respiratory distress. She has no wheezes. She has no rales.     Skin Exam:     CVP: Not measured  ScVO2: not measured  Fluid Challenge: responsive            Dx :  1)severe sepsis  2)Pylonephtotis   3)Hypokalemia     Plan: cont IVF at 150 cc/hr .  Cont close monitor of bp ; if map is < than 65  Will need vasopressor   1 lt ns bolus   F/u  Blood and urine cx  Cont broad spectrum IVAB       Critical care time 20 min

## 2018-09-23 NOTE — SUBJECTIVE & OBJECTIVE
Past Medical History:   Diagnosis Date    Asthma     exercise induced    Kidney stone        Past Surgical History:   Procedure Laterality Date    CYSTOSCOPY W/ RETROGRADES Right 9/14/2018    Procedure: CYSTOSCOPY, WITH RETROGRADE PYELOGRAM;  Surgeon: Mian Childs MD;  Location: [facility] OR [address] [place] [address];  Service: Urology;  Laterality: Right;    CYSTOSCOPY W/ URETERAL STENT PLACEMENT Right 9/14/2018    Procedure: CYSTOSCOPY, WITH URETERAL STENT INSERTION;  Surgeon: Mian Childs MD;  Location: [facility] OR [address] [place] [address];  Service: Urology;  Laterality: Right;    CYSTOSCOPY, WITH RETROGRADE PYELOGRAM Right 9/14/2018    Performed by Mian Childs MD at [facility] OR [address] [place] [address]    CYSTOSCOPY, WITH URETERAL STENT INSERTION Right 9/14/2018    Performed by Mian Childs MD at [facility] OR [address] [place] [address]    REMOVAL, CALCULUS, URETER, URETEROSCOPIC Right 9/14/2018    Performed by Mian Childs MD at [facility] OR [address] [place] [address]    URETEROSCOPIC REMOVAL OF URETERIC CALCULUS Right 9/14/2018    Procedure: REMOVAL, CALCULUS, URETER, URETEROSCOPIC;  Surgeon: Mian Childs MD;  Location: [facility] OR [address] [place] [address];  Service: Urology;  Laterality: Right;  1.5 hours    WISDOM TOOTH EXTRACTION         Review of patient's allergies indicates:  No Known Allergies    Current Facility-Administered Medications on File Prior to Encounter   Medication    [COMPLETED] cefTRIAXone injection 1 g    [COMPLETED] sodium chloride 0.9% bolus 1,000 mL    [DISCONTINUED] acetaminophen tablet 1,000 mg    [DISCONTINUED] cefTRIAXone injection 1 g     Current Outpatient Medications on File Prior to Encounter   Medication Sig    acetaminophen (TYLENOL) 500 MG tablet Take 500 mg by mouth every 6 (six) hours as needed for Pain.    ISOtretinoin (ACCUTANE) 40 MG capsule Take 40 mg by mouth 2 (two) times daily.    ketorolac (TORADOL) 10 mg tablet Take 1 tablet (10 mg total) by mouth every 6 (six) hours as needed for Pain.    ondansetron (ZOFRAN) 8 MG tablet Take 1 tablet (8 mg  total) by mouth every 8 (eight) hours as needed for Nausea.    oxyCODONE-acetaminophen (PERCOCET) 5-325 mg per tablet Take 1 tablet by mouth every 4 (four) hours as needed for Pain.    sulfamethoxazole-trimethoprim 800-160mg (BACTRIM DS) 800-160 mg Tab Take 1 tablet by mouth 2 (two) times daily. for 7 days    tamsulosin (FLOMAX) 0.4 mg Cap Take 1 capsule (0.4 mg total) by mouth once daily.    VENTOLIN HFA 90 mcg/actuation inhaler INHALE TWO TO FOUR PUFFS 30 minutes prior TO exercise    acetaminophen-codeine 300-30mg (TYLENOL #3) 300-30 mg Tab Take 1 tablet by mouth every 6 (six) hours as needed. FOR SEVERE PAIN     Family History     Problem Relation (Age of Onset)    Diabetes Maternal Grandmother        Tobacco Use    Smoking status: Never Smoker    Smokeless tobacco: Never Used   Substance and Sexual Activity    Alcohol use: No     Frequency: Never    Drug use: No    Sexual activity: No     Review of Systems   Constitutional: Positive for chills and fever.   HENT: Negative for congestion, nosebleeds, sinus pressure and trouble swallowing.    Eyes: Negative for photophobia.   Respiratory: Negative for cough, chest tightness, shortness of breath and wheezing.    Cardiovascular: Negative for chest pain, palpitations and leg swelling.   Gastrointestinal: Positive for abdominal pain (mild suprapubic), nausea and vomiting. Negative for diarrhea.   Endocrine: Negative for polyphagia and polyuria.   Genitourinary: Positive for flank pain. Negative for dysuria and hematuria.   Musculoskeletal: Negative for back pain and neck pain.   Skin: Negative for color change and wound.   Allergic/Immunologic: Negative for immunocompromised state.   Neurological: Negative for dizziness, seizures, weakness and headaches.   Hematological: Negative for adenopathy. Does not bruise/bleed easily.   Psychiatric/Behavioral: Negative for confusion, hallucinations and sleep disturbance. The patient is not nervous/anxious.    All other  systems reviewed and are negative.    Objective:     Vital Signs (Most Recent):  Temp: 98 °F (36.7 °C) (09/23/18 0447)  Pulse: 96 (09/23/18 0447)  Resp: 16 (09/23/18 0447)  BP: (!) 99/58 (09/23/18 0447)  SpO2: 98 % (09/23/18 0447) Vital Signs (24h Range):  Temp:  [98 °F (36.7 °C)-102.8 °F (39.3 °C)] 98 °F (36.7 °C)  Pulse:  [] 96  Resp:  [16-21] 16  SpO2:  [95 %-100 %] 98 %  BP: ()/(50-78) 99/58     Weight: 82.1 kg (180 lb 16 oz)  Body mass index is 31.07 kg/m².    Physical Exam   Constitutional: She is oriented to person, place, and time. She appears well-developed and well-nourished. No distress.   Healthy appearing  female, in no distress, comfortably lying in bed, pleasant.  Mother and sister at the bedside.   HENT:   Head: Normocephalic and atraumatic.   Eyes: Conjunctivae and EOM are normal. No scleral icterus.   Neck: Normal range of motion. Neck supple.   Cardiovascular: Regular rhythm and intact distal pulses. Tachycardia present.   No murmur heard.  Pulmonary/Chest: Effort normal and breath sounds normal. No respiratory distress. She has no wheezes.   Abdominal: Soft. She exhibits no distension. There is no tenderness. There is CVA tenderness (right). There is no guarding. No hernia.   Musculoskeletal: Normal range of motion. She exhibits no edema or tenderness.   Neurological: She is alert and oriented to person, place, and time. No cranial nerve deficit. She exhibits normal muscle tone. Coordination normal.   Skin: Skin is warm. Capillary refill takes less than 2 seconds. She is not diaphoretic. No erythema.   Psychiatric: She has a normal mood and affect. Her behavior is normal. Judgment and thought content normal.   Nursing note and vitals reviewed.        CRANIAL NERVES     CN III, IV, VI   Extraocular motions are normal.        Significant Labs:   Blood Culture: No results for input(s): LABBLOO in the last 48 hours.  BMP:   Recent Labs   Lab  09/23/18   0034   GLU  145*   NA  138    K  3.3*   CL  103   CO2  20*   BUN  13   CREATININE  1.0   CALCIUM  9.1     CBC:   Recent Labs   Lab  09/22/18   1403  09/23/18   0034   WBC  25.02*  27.37*   HGB  13.7  12.4   HCT  41.4  37.0   PLT  380*  255     CMP:   Recent Labs   Lab  09/22/18   1403  09/23/18   0034   NA  138  138   K  3.4*  3.3*   CL  106  103   CO2  18*  20*   GLU  125*  145*   BUN  15  13   CREATININE  1.1  1.0   CALCIUM  9.5  9.1   PROT   --   7.0   ALBUMIN   --   3.6   BILITOT   --   0.8   ALKPHOS   --   63   AST   --   16   ALT   --   13   ANIONGAP  14  15   EGFRNONAA  >60.0  >60     Lactic Acid:   Recent Labs   Lab  09/23/18   0034   LACTATE  2.2     Urine Culture: No results for input(s): LABURIN in the last 48 hours.  Urine Studies:   Recent Labs   Lab  09/23/18   0048   COLORU  Yellow   APPEARANCEUA  Clear   PHUR  6.0   SPECGRAV  >=1.030*   PROTEINUA  2+*   GLUCUA  Negative   KETONESU  Trace*   BILIRUBINUA  1+*   OCCULTUA  2+*   NITRITE  Negative   UROBILINOGEN  Negative   LEUKOCYTESUR  1+*   RBCUA  15*   WBCUA  55*   BACTERIA  Few*   SQUAMEPITHEL  10   HYALINECASTS  0     All pertinent labs within the past 24 hours have been reviewed.    Significant Imaging: I have reviewed and interpreted all pertinent imaging results/findings within the past 24 hours.     Imaging Results          CT Abdomen Pelvis  Without Contrast (In process)                 I have independently reviewed and interpreted the EKG.     I have independently reviewed all pertinent labs within the past 24 hours.    I have independently reviewed, visualized and interpreted all pertinent imaging results within the past 24 hours and discussed the findings with the ED physician, Dr. Cortés.

## 2018-09-23 NOTE — TELEPHONE ENCOUNTER
"Mom called re kidney stone rem'd last fri- had stent remd wed. thurs having pain on R side. yest at 930pm [id number], took toradol and tylenol. T104 today. Pt seen in ED today - given abx and fluids told to take tylenol. Tylenol at 1pm at 4pm fever 101.4. Mom gave ibuprofen at 4pm.   No rash   Reason for Disposition   Sounds like a serious complication to the triager    Answer Assessment - Initial Assessment Questions  1. SYMPTOM: "What's the main symptom you're concerned about?" (e.g., pain, fever, vomiting)     T104  2. ONSET: "When did ________  start?"     9/22  3. SURGERY: "What surgery was performed?"     Stent   4. DATE of SURGERY: "When was surgery performed?"      Face is red, nauseated, neck stiff at 530pm - pt in car now. Stiff neck - Not able to to put chin to chest. On the way home to .   5. ANESTHESIA: " What type of anesthesia did you have?" (e.g., general, spinal, epidural, local)    N/a   6. PAIN: "Is there any pain?" If so, ask: "How bad is it?"  (Scale 1-10; or mild, moderate, severe)     Pain 6-7 neck   7. FEVER: "Do you have a fever?" If so, ask: "What is your temperature, how was it measured, and when did it start?"     104   8. VOMITING: "Is there any vomiting?" If yes, ask: "How many times?"     Nausea, no vomit  9. BLEEDING: "Is there any bleeding?" If so, ask: "How much?" and "Where?"    N/a    Protocols used: ST POST-OP SYMPTOMS AND [id number]    rec ED due to stiff neck, fever, pain. Parent requests to speak with MD on call. Pt transferred via  to MD on call. Call back with questions.    "

## 2018-09-23 NOTE — ASSESSMENT & PLAN NOTE
Recent right ureteral stent placement on 9/14/18, removal on 9/19/18, by Dr. Childs at [facility].

## 2018-09-23 NOTE — H&P
[facility] Hospital Medicine  History & Physical    Patient Name: Jeremiah Medina  MRN: [id number]  Admission Date: 9/22/2018  Attending Physician: Rashid Paulino MD  Primary Care Provider: Matthew Loo Ii, MD         Patient information was obtained from patient, parent, relative(s), past medical records and ER records.     Subjective:     Principal Problem:Severe sepsis    Chief Complaint:   Chief Complaint   Patient presents with    Flank Pain     stent placed for kidney stone 9/14, removed 9/19; dx kidney infection 9/22; increased flank pain, HA, n/v, fever; fever max of 102 at home; prescribed bactrim today        HPI: Ms. Medina is a young 18-year-old  female student with no significant medical problems, otherwise healthy, presents to the [facility] ED today (9/23/18) complaining of fever, and right flank pain. Patient had history of nephrolithiasis, had a right ureteral stent placed on 9/14/18, the stent was taken out on 9/19/18 by Dr. Childs at [facility].  Yesterday patient presented to the [place] ED complaining of fever, she was given one dose of IV Rocephin and discharged from the ED with oral Bactrim.  Patient came to visit her mother in [place], symptoms got progressively worse, fever of 102.8, nausea, vomiting, right flank pain.    She is found to be in severe sepsis with temperature 102.8°, heart rate > 90, WBC 25,000, with 25% bands, lactic acid 2.2, procalcitonin 5.5.  Initial blood pressure 81/51, MAP 61.  Patient received 30 mL/kilogram normal saline bolus in the ED, with improvement of blood pressure to 99/58, MAP 75.  Blood and urine cultures were obtained.  Started on IV Zosyn and vancomycin empirically. Preliminary reading of CT abdomen pelvis without contrast  reveals no renal calculus. No hydronephrosis. Subtle right perinephric stranding consistent with pyelonephritis.         Past Medical History:   Diagnosis Date    Asthma      exercise induced    Kidney stone        Past Surgical History:   Procedure Laterality Date    CYSTOSCOPY W/ RETROGRADES Right 9/14/2018    Procedure: CYSTOSCOPY, WITH RETROGRADE PYELOGRAM;  Surgeon: Mian Childs MD;  Location: [facility] OR [address] [place] [address];  Service: Urology;  Laterality: Right;    CYSTOSCOPY W/ URETERAL STENT PLACEMENT Right 9/14/2018    Procedure: CYSTOSCOPY, WITH URETERAL STENT INSERTION;  Surgeon: Mian Childs MD;  Location: [facility] OR [address] [place] [address];  Service: Urology;  Laterality: Right;    CYSTOSCOPY, WITH RETROGRADE PYELOGRAM Right 9/14/2018    Performed by Mian Childs MD at [facility] OR [address] [place] [address]    CYSTOSCOPY, WITH URETERAL STENT INSERTION Right 9/14/2018    Performed by Mian Childs MD at [facility] OR [address] [place] [address]    REMOVAL, CALCULUS, URETER, URETEROSCOPIC Right 9/14/2018    Performed by Mian Childs MD at [facility] OR [address] [place] [address]    URETEROSCOPIC REMOVAL OF URETERIC CALCULUS Right 9/14/2018    Procedure: REMOVAL, CALCULUS, URETER, URETEROSCOPIC;  Surgeon: Mian Childs MD;  Location: [facility] OR [address] [place] [address];  Service: Urology;  Laterality: Right;  1.5 hours    WISDOM TOOTH EXTRACTION         Review of patient's allergies indicates:  No Known Allergies    Current Facility-Administered Medications on File Prior to Encounter   Medication    [COMPLETED] cefTRIAXone injection 1 g    [COMPLETED] sodium chloride 0.9% bolus 1,000 mL    [DISCONTINUED] acetaminophen tablet 1,000 mg    [DISCONTINUED] cefTRIAXone injection 1 g     Current Outpatient Medications on File Prior to Encounter   Medication Sig    acetaminophen (TYLENOL) 500 MG tablet Take 500 mg by mouth every 6 (six) hours as needed for Pain.    ISOtretinoin (ACCUTANE) 40 MG capsule Take 40 mg by mouth 2 (two) times daily.    ketorolac (TORADOL) 10 mg tablet Take 1 tablet (10 mg total) by mouth every 6 (six) hours as needed for Pain.    ondansetron (ZOFRAN) 8 MG tablet Take 1 tablet (8 mg total) by mouth every 8 (eight) hours as needed for  Nausea.    oxyCODONE-acetaminophen (PERCOCET) 5-325 mg per tablet Take 1 tablet by mouth every 4 (four) hours as needed for Pain.    sulfamethoxazole-trimethoprim 800-160mg (BACTRIM DS) 800-160 mg Tab Take 1 tablet by mouth 2 (two) times daily. for 7 days    tamsulosin (FLOMAX) 0.4 mg Cap Take 1 capsule (0.4 mg total) by mouth once daily.    VENTOLIN HFA 90 mcg/actuation inhaler INHALE TWO TO FOUR PUFFS 30 minutes prior TO exercise    acetaminophen-codeine 300-30mg (TYLENOL #3) 300-30 mg Tab Take 1 tablet by mouth every 6 (six) hours as needed. FOR SEVERE PAIN     Family History     Problem Relation (Age of Onset)    Diabetes Maternal Grandmother        Tobacco Use    Smoking status: Never Smoker    Smokeless tobacco: Never Used   Substance and Sexual Activity    Alcohol use: No     Frequency: Never    Drug use: No    Sexual activity: No     Review of Systems   Constitutional: Positive for chills and fever.   HENT: Negative for congestion, nosebleeds, sinus pressure and trouble swallowing.    Eyes: Negative for photophobia.   Respiratory: Negative for cough, chest tightness, shortness of breath and wheezing.    Cardiovascular: Negative for chest pain, palpitations and leg swelling.   Gastrointestinal: Positive for abdominal pain (mild suprapubic), nausea and vomiting. Negative for diarrhea.   Endocrine: Negative for polyphagia and polyuria.   Genitourinary: Positive for flank pain. Negative for dysuria and hematuria.   Musculoskeletal: Negative for back pain and neck pain.   Skin: Negative for color change and wound.   Allergic/Immunologic: Negative for immunocompromised state.   Neurological: Negative for dizziness, seizures, weakness and headaches.   Hematological: Negative for adenopathy. Does not bruise/bleed easily.   Psychiatric/Behavioral: Negative for confusion, hallucinations and sleep disturbance. The patient is not nervous/anxious.    All other systems reviewed and are negative.    Objective:      Vital Signs (Most Recent):  Temp: 98 °F (36.7 °C) (09/23/18 0447)  Pulse: 96 (09/23/18 0447)  Resp: 16 (09/23/18 0447)  BP: (!) 99/58 (09/23/18 0447)  SpO2: 98 % (09/23/18 0447) Vital Signs (24h Range):  Temp:  [98 °F (36.7 °C)-102.8 °F (39.3 °C)] 98 °F (36.7 °C)  Pulse:  [] 96  Resp:  [16-21] 16  SpO2:  [95 %-100 %] 98 %  BP: ()/(50-78) 99/58     Weight: 82.1 kg (180 lb 16 oz)  Body mass index is 31.07 kg/m².    Physical Exam   Constitutional: She is oriented to person, place, and time. She appears well-developed and well-nourished. No distress.   Healthy appearing  female, in no distress, comfortably lying in bed, pleasant.  Mother and sister at the bedside.   HENT:   Head: Normocephalic and atraumatic.   Eyes: Conjunctivae and EOM are normal. No scleral icterus.   Neck: Normal range of motion. Neck supple.   Cardiovascular: Regular rhythm and intact distal pulses. Tachycardia present.   No murmur heard.  Pulmonary/Chest: Effort normal and breath sounds normal. No respiratory distress. She has no wheezes.   Abdominal: Soft. She exhibits no distension. There is no tenderness. There is CVA tenderness (right). There is no guarding. No hernia.   Musculoskeletal: Normal range of motion. She exhibits no edema or tenderness.   Neurological: She is alert and oriented to person, place, and time. No cranial nerve deficit. She exhibits normal muscle tone. Coordination normal.   Skin: Skin is warm. Capillary refill takes less than 2 seconds. She is not diaphoretic. No erythema.   Psychiatric: She has a normal mood and affect. Her behavior is normal. Judgment and thought content normal.   Nursing note and vitals reviewed.        CRANIAL NERVES     CN III, IV, VI   Extraocular motions are normal.        Significant Labs:   Blood Culture: No results for input(s): LABBLOO in the last 48 hours.  BMP:   Recent Labs   Lab  09/23/18   0034   GLU  145*   NA  138   K  3.3*   CL  103   CO2  20*   BUN  13    CREATININE  1.0   CALCIUM  9.1     CBC:   Recent Labs   Lab  09/22/18   1403  09/23/18   0034   WBC  25.02*  27.37*   HGB  13.7  12.4   HCT  41.4  37.0   PLT  380*  255     CMP:   Recent Labs   Lab  09/22/18   1403  09/23/18   0034   NA  138  138   K  3.4*  3.3*   CL  106  103   CO2  18*  20*   GLU  125*  145*   BUN  15  13   CREATININE  1.1  1.0   CALCIUM  9.5  9.1   PROT   --   7.0   ALBUMIN   --   3.6   BILITOT   --   0.8   ALKPHOS   --   63   AST   --   16   ALT   --   13   ANIONGAP  14  15   EGFRNONAA  >60.0  >60     Lactic Acid:   Recent Labs   Lab  09/23/18   0034   LACTATE  2.2     Urine Culture: No results for input(s): LABURIN in the last 48 hours.  Urine Studies:   Recent Labs   Lab  09/23/18   0048   COLORU  Yellow   APPEARANCEUA  Clear   PHUR  6.0   SPECGRAV  >=1.030*   PROTEINUA  2+*   GLUCUA  Negative   KETONESU  Trace*   BILIRUBINUA  1+*   OCCULTUA  2+*   NITRITE  Negative   UROBILINOGEN  Negative   LEUKOCYTESUR  1+*   RBCUA  15*   WBCUA  55*   BACTERIA  Few*   SQUAMEPITHEL  10   HYALINECASTS  0     All pertinent labs within the past 24 hours have been reviewed.    Significant Imaging: I have reviewed and interpreted all pertinent imaging results/findings within the past 24 hours.     Imaging Results          CT Abdomen Pelvis  Without Contrast (In process)                 I have independently reviewed and interpreted the EKG.     I have independently reviewed all pertinent labs within the past 24 hours.    I have independently reviewed, visualized and interpreted all pertinent imaging results within the past 24 hours and discussed the findings with the ED physician, Dr. Cortés.            Assessment/Plan:     * Severe sepsis    Fever 102.8°,  , WBC 25,000, with 25% bands, lactic acid 2.2, procalcitonin 5.5.    Initial blood pressure 81/51, MAP 61.    Improved after she received NS 30 mL/kg bolus in the ED  Latest blood pressure 99/58, MAP 75.  Continue normal saline at 150 cc/hour.  Blood  and urine cultures obtained.  Will empirically continue IV vancomycin, IV Zosyn.  Deescalate antibiotics pending culture results.  Repeat lactic acid in 4 hr.          Acute pyelonephritis    CT abdomen/pelvis reveals mom mild perinephric stranding on the right kidney.  Continue IV antibiotics as mentioned above.  Follow up on cultures.  Continue IV fluids and supportive care.          Urolithiasis    Recent right ureteral stent placement on 9/14/18, removal on 9/19/18, by Dr. Childs at [facility].            VTE Risk Mitigation (From admission, onward)        Ordered     Place sequential compression device  Until discontinued      09/23/18 4492             Rashid Paulino MD  Department of Hospital Medicine   [facility]

## 2018-09-23 NOTE — PLAN OF CARE
Problem: Patient Care Overview  Goal: Plan of Care Review  Outcome: Ongoing (interventions implemented as appropriate)  Patient AAO and VSS.    IVF/ antibiotics administered as ordered. Pain adequately managed with prn meds. Remains free of injury. Fall precautions maintained with bed low and wheels locked. Chart review for 24 hours completed. Will continue to monitor till discharge.

## 2018-09-23 NOTE — ASSESSMENT & PLAN NOTE
Fever 102.8°, , WBC 25,000, with 25% bands, lactic acid 2.2, procalcitonin 5.5.    Initial blood pressure 81/51, MAP 61.    Improved after she received NS 30 mL/kg bolus in the ED  Latest blood pressure 99/58, MAP 75.  Continue normal saline at 150 cc/hour.  Blood and urine cultures obtained.  Will empirically continue IV vancomycin, IV Zosyn.  Deescalate antibiotics pending culture results.  Repeat lactic acid in 4 hr.

## 2018-09-23 NOTE — ASSESSMENT & PLAN NOTE
CT abdomen/pelvis reveals mom mild perinephric stranding on the right kidney.  Continue IV antibiotics as mentioned above.  Follow up on cultures.  Continue IV fluids and supportive care.

## 2018-09-24 ENCOUNTER — TELEPHONE (OUTPATIENT)
Dept: UROLOGY | Facility: CLINIC | Age: 18
End: 2018-09-24

## 2018-09-24 PROBLEM — N20.9 UROLITHIASIS: Status: RESOLVED | Noted: 2018-09-14 | Resolved: 2018-09-24

## 2018-09-24 PROBLEM — N12 PYELONEPHRITIS: Status: ACTIVE | Noted: 2018-09-24

## 2018-09-24 LAB
ALBUMIN SERPL BCP-MCNC: 2.8 G/DL
ALP SERPL-CCNC: 69 U/L
ALT SERPL W/O P-5'-P-CCNC: 11 U/L
ANION GAP SERPL CALC-SCNC: 8 MMOL/L
AST SERPL-CCNC: 14 U/L
BASOPHILS # BLD AUTO: 0.02 K/UL
BASOPHILS NFR BLD: 0.1 %
BILIRUB SERPL-MCNC: 0.4 MG/DL
BUN SERPL-MCNC: 8 MG/DL
CALCIUM SERPL-MCNC: 8.6 MG/DL
CHLORIDE SERPL-SCNC: 111 MMOL/L
CO2 SERPL-SCNC: 17 MMOL/L
CREAT SERPL-MCNC: 0.8 MG/DL
DIFFERENTIAL METHOD: ABNORMAL
EOSINOPHIL # BLD AUTO: 0 K/UL
EOSINOPHIL NFR BLD: 0.2 %
ERYTHROCYTE [DISTWIDTH] IN BLOOD BY AUTOMATED COUNT: 15.1 %
EST. GFR  (AFRICAN AMERICAN): >60 ML/MIN/1.73 M^2
EST. GFR  (NON AFRICAN AMERICAN): >60 ML/MIN/1.73 M^2
GLUCOSE SERPL-MCNC: 118 MG/DL
HCT VFR BLD AUTO: 32.6 %
HGB BLD-MCNC: 10.4 G/DL
LYMPHOCYTES # BLD AUTO: 1.6 K/UL
LYMPHOCYTES NFR BLD: 7.7 %
MAGNESIUM SERPL-MCNC: 1.7 MG/DL
MCH RBC QN AUTO: 28.7 PG
MCHC RBC AUTO-ENTMCNC: 31.9 G/DL
MCV RBC AUTO: 90 FL
MONOCYTES # BLD AUTO: 1.5 K/UL
MONOCYTES NFR BLD: 7.3 %
NEUTROPHILS # BLD AUTO: 17.3 K/UL
NEUTROPHILS NFR BLD: 84.7 %
PHOSPHATE SERPL-MCNC: 1.2 MG/DL
PLATELET # BLD AUTO: 238 K/UL
PMV BLD AUTO: 10.6 FL
POTASSIUM SERPL-SCNC: 4 MMOL/L
PROT SERPL-MCNC: 5.8 G/DL
RBC # BLD AUTO: 3.63 M/UL
SODIUM SERPL-SCNC: 136 MMOL/L
WBC # BLD AUTO: 20.47 K/UL

## 2018-09-24 PROCEDURE — 25000003 PHARM REV CODE 250: Performed by: INTERNAL MEDICINE

## 2018-09-24 PROCEDURE — 63600175 PHARM REV CODE 636 W HCPCS: Performed by: INTERNAL MEDICINE

## 2018-09-24 PROCEDURE — 25000003 PHARM REV CODE 250: Performed by: NURSE PRACTITIONER

## 2018-09-24 PROCEDURE — 84100 ASSAY OF PHOSPHORUS: CPT

## 2018-09-24 PROCEDURE — 85025 COMPLETE CBC W/AUTO DIFF WBC: CPT

## 2018-09-24 PROCEDURE — 21400001 HC TELEMETRY ROOM

## 2018-09-24 PROCEDURE — 83735 ASSAY OF MAGNESIUM: CPT

## 2018-09-24 PROCEDURE — 63600175 PHARM REV CODE 636 W HCPCS: Performed by: NURSE PRACTITIONER

## 2018-09-24 PROCEDURE — 80053 COMPREHEN METABOLIC PANEL: CPT

## 2018-09-24 PROCEDURE — 36415 COLL VENOUS BLD VENIPUNCTURE: CPT

## 2018-09-24 RX ORDER — IBUPROFEN 400 MG/1
400 TABLET ORAL EVERY 6 HOURS PRN
Status: DISCONTINUED | OUTPATIENT
Start: 2018-09-24 | End: 2018-09-24

## 2018-09-24 RX ORDER — MORPHINE SULFATE 2 MG/ML
1 INJECTION, SOLUTION INTRAMUSCULAR; INTRAVENOUS EVERY 6 HOURS PRN
Status: DISCONTINUED | OUTPATIENT
Start: 2018-09-24 | End: 2018-09-27 | Stop reason: HOSPADM

## 2018-09-24 RX ORDER — KETOROLAC TROMETHAMINE 30 MG/ML
30 INJECTION, SOLUTION INTRAMUSCULAR; INTRAVENOUS ONCE
Status: COMPLETED | OUTPATIENT
Start: 2018-09-24 | End: 2018-09-24

## 2018-09-24 RX ORDER — IBUPROFEN 400 MG/1
400 TABLET ORAL EVERY 6 HOURS PRN
Status: DISCONTINUED | OUTPATIENT
Start: 2018-09-24 | End: 2018-09-27 | Stop reason: HOSPADM

## 2018-09-24 RX ORDER — SODIUM CHLORIDE 9 MG/ML
1000 INJECTION, SOLUTION INTRAVENOUS CONTINUOUS
Status: ACTIVE | OUTPATIENT
Start: 2018-09-24 | End: 2018-09-25

## 2018-09-24 RX ORDER — SODIUM CHLORIDE 9 MG/ML
1000 INJECTION, SOLUTION INTRAVENOUS CONTINUOUS
Status: DISCONTINUED | OUTPATIENT
Start: 2018-09-24 | End: 2018-09-24

## 2018-09-24 RX ORDER — LINEZOLID 2 MG/ML
600 INJECTION, SOLUTION INTRAVENOUS
Status: DISCONTINUED | OUTPATIENT
Start: 2018-09-24 | End: 2018-09-25

## 2018-09-24 RX ORDER — BUTALBITAL, ACETAMINOPHEN AND CAFFEINE 50; 325; 40 MG/1; MG/1; MG/1
1 TABLET ORAL EVERY 6 HOURS PRN
Status: DISCONTINUED | OUTPATIENT
Start: 2018-09-24 | End: 2018-09-24

## 2018-09-24 RX ADMIN — IBUPROFEN 400 MG: 400 TABLET, FILM COATED ORAL at 12:09

## 2018-09-24 RX ADMIN — BUTALBITAL, ACETAMINOPHEN, AND CAFFEINE 1 TABLET: 50; 325; 40 TABLET ORAL at 12:09

## 2018-09-24 RX ADMIN — PIPERACILLIN AND TAZOBACTAM 4.5 G: 4; .5 INJECTION, POWDER, LYOPHILIZED, FOR SOLUTION INTRAVENOUS; PARENTERAL at 09:09

## 2018-09-24 RX ADMIN — PIPERACILLIN AND TAZOBACTAM 4.5 G: 4; .5 INJECTION, POWDER, LYOPHILIZED, FOR SOLUTION INTRAVENOUS; PARENTERAL at 05:09

## 2018-09-24 RX ADMIN — FAMOTIDINE 20 MG: 20 TABLET ORAL at 09:09

## 2018-09-24 RX ADMIN — SODIUM CHLORIDE 1000 ML: 0.9 INJECTION, SOLUTION INTRAVENOUS at 02:09

## 2018-09-24 RX ADMIN — LINEZOLID 600 MG: 600 INJECTION, SOLUTION INTRAVENOUS at 03:09

## 2018-09-24 RX ADMIN — BUTALBITAL, ACETAMINOPHEN, AND CAFFEINE 1 TABLET: 50; 325; 40 TABLET ORAL at 07:09

## 2018-09-24 RX ADMIN — ACETAMINOPHEN 650 MG: 325 TABLET, FILM COATED ORAL at 12:09

## 2018-09-24 RX ADMIN — KETOROLAC TROMETHAMINE 30 MG: 30 INJECTION INTRAMUSCULAR; INTRAVENOUS at 05:09

## 2018-09-24 RX ADMIN — IBUPROFEN 400 MG: 400 TABLET ORAL at 09:09

## 2018-09-24 RX ADMIN — GUAIFENESIN 200 MG: 200 SOLUTION ORAL at 05:09

## 2018-09-24 RX ADMIN — VANCOMYCIN HYDROCHLORIDE 1000 MG: 1 INJECTION, POWDER, LYOPHILIZED, FOR SOLUTION INTRAVENOUS at 06:09

## 2018-09-24 RX ADMIN — PIPERACILLIN AND TAZOBACTAM 4.5 G: 4; .5 INJECTION, POWDER, LYOPHILIZED, FOR SOLUTION INTRAVENOUS; PARENTERAL at 02:09

## 2018-09-24 NOTE — PROGRESS NOTES
Dr Marshall on floor and informed of temperature. 103.1 oral after motrin given 45 minutes prior. Ice packs given to patient. Cool wash cloth. Mother at bedside. Patient states when she runs temp her chest feels tight. Dr Marshall informed.

## 2018-09-24 NOTE — PROGRESS NOTES
[facility] Hospital Medicine  Progress Note    Patient Name: Jeremiah Medina  MRN: [id number]  Patient Class: IP- Inpatient   Admission Date: 9/22/2018  Length of Stay: 1 days  Attending Physician: Shhaid Fuller, *  Primary Care Provider: Matthew Loo Ii, MD        Subjective:     Principal Problem:Severe sepsis    HPI:  Ms. Medina is a young 18-year-old  female student with no significant medical problems, otherwise healthy, presents to the [facility] ED today (9/23/18) complaining of fever, and right flank pain. Patient had history of nephrolithiasis, had a right ureteral stent placed on 9/14/18, the stent was taken out on 9/19/18 by Dr. Childs at [facility].  Yesterday patient presented to the [place] ED complaining of fever, she was given one dose of IV Rocephin and discharged from the ED with oral Bactrim.  Patient came to visit her mother in [place], symptoms got progressively worse, fever of 102.8, nausea, vomiting, right flank pain.    She is found to be in severe sepsis with temperature 102.8°, heart rate > 90, WBC 25,000, with 25% bands, lactic acid 2.2, procalcitonin 5.5.  Initial blood pressure 81/51, MAP 61.  Patient received 30 mL/kilogram normal saline bolus in the ED, with improvement of blood pressure to 99/58, MAP 75.  Blood and urine cultures were obtained.  Started on IV Zosyn and vancomycin empirically. Preliminary reading of CT abdomen pelvis without contrast  reveals no renal calculus. No hydronephrosis. Subtle right perinephric stranding consistent with pyelonephritis.         Hospital Course:  17 y/o wf admitted  With a dx of severe sepsis 2/2/ to pyelonephritis . Patient had history of nephrolithiasis, had a right ureteral stent placed on 9/14/18. The stent was taken out on 9/19/18 by Dr. Childs at [facility]. CT scan from 9/22/18 which show pyelonephritis . Pt was started on IV vanc and zosyn .  She  had 30 cc/kg IVF  Per sepsis protocol . The blood pressure is borderline low  With a MAP > 65 . The  Lactic acid  Was 2.2 (POA) and procalcitonin level elevated (POA). Pt tempeture  Went up to 104.3 F  And trend down after  Dose of tylenol, ibuprofen and  IV Toradol .  The urine cx  Is positive for enterococcus         Interval History: Pt was seen and examined at bedside . She states feeling better . She  Cont with mild fever and chills .     Review of Systems   Constitutional: Positive for chills and fever.   HENT: Negative for congestion, nosebleeds, sinus pressure and trouble swallowing.    Eyes: Negative for photophobia.   Respiratory: Negative for cough, chest tightness, shortness of breath and wheezing.    Cardiovascular: Negative for chest pain, palpitations and leg swelling.   Gastrointestinal: Positive for abdominal pain (mild suprapubic). Negative for constipation and diarrhea.   Endocrine: Negative for polyphagia and polyuria.   Genitourinary: Positive for flank pain. Negative for dysuria and hematuria.   Musculoskeletal: Positive for back pain and myalgias. Negative for neck pain.   Skin: Negative for color change and wound.   Allergic/Immunologic: Negative for immunocompromised state.   Neurological: Negative for dizziness, seizures, weakness and headaches.   Hematological: Negative for adenopathy. Does not bruise/bleed easily.   Psychiatric/Behavioral: Negative for confusion, hallucinations and sleep disturbance. The patient is not nervous/anxious.    All other systems reviewed and are negative.    Objective:     Vital Signs (Most Recent):  Temp: 98.9 °F (37.2 °C) (09/24/18 0913)  Pulse: 102 (09/24/18 0729)  Resp: 18 (09/24/18 0729)  BP: 110/66 (09/24/18 0729)  SpO2: 96 % (09/24/18 0729) Vital Signs (24h Range):  Temp:  [98.1 °F (36.7 °C)-104.1 °F (40.1 °C)] 98.9 °F (37.2 °C)  Pulse:  [] 102  Resp:  [16-20] 18  SpO2:  [92 %-100 %] 96 %  BP: ()/(52-66) 110/66     Weight: 82.1 kg (180 lb 16  oz)  Body mass index is 31.07 kg/m².    Intake/Output Summary (Last 24 hours) at 9/24/2018 0928  Last data filed at 9/24/2018 0913  Gross per 24 hour   Intake 6257.5 ml   Output 1300 ml   Net 4957.5 ml      Physical Exam   Constitutional: She is oriented to person, place, and time. She appears well-developed and well-nourished. No distress.   Healthy appearing  female, in no distress, comfortably lying in bed, pleasant.  Mother and sister at the bedside.   HENT:   Head: Normocephalic and atraumatic.   Eyes: Conjunctivae and EOM are normal. No scleral icterus.   Neck: Normal range of motion. Neck supple.   Cardiovascular: Normal rate, regular rhythm and intact distal pulses.   No murmur heard.  Pulmonary/Chest: Effort normal and breath sounds normal. No respiratory distress. She has no wheezes.   Abdominal: Soft. She exhibits no distension. There is no tenderness. There is CVA tenderness (right). There is no guarding. No hernia.   Musculoskeletal: Normal range of motion. She exhibits no edema or tenderness.   Neurological: She is alert and oriented to person, place, and time. No cranial nerve deficit. She exhibits normal muscle tone. Coordination normal.   Skin: Skin is warm. Capillary refill takes less than 2 seconds. She is not diaphoretic. No erythema.   Psychiatric: She has a normal mood and affect. Her behavior is normal. Judgment and thought content normal.   Nursing note and vitals reviewed.      Significant Labs: All pertinent labs within the past 24 hours have been reviewed.    Significant Imaging: I have reviewed all pertinent imaging results/findings within the past 24 hours.    Assessment/Plan:      * Severe sepsis    (+) urine  cx   Enterococcus  Cont IVF  And  IVAB  Keep MAP > 65             Acute pyelonephritis    CT abdomen/pelvis reveals mom mild perinephric stranding on the right kidney.  Continue IV antibiotics  Blood cx NGTD  Urine cx (+) for enterococcus   Continue IV fluids and  supportive care.  Consult ID           Urolithiasis    Recent right ureteral stent placement on 9/14/18, removal on 9/19/18, by Dr. Childs at [facility].            VTE Risk Mitigation (From admission, onward)        Ordered     Place sequential compression device  Until discontinued      09/23/18 0510              Shahid Fuller MD  Department of Hospital Medicine   [facility]

## 2018-09-24 NOTE — NURSING
Pt became febrile temp 100.9. Placed ice packs and cool towels on patient. Pt temp came down to 99.7. Pt mother at bedside requested a chest xray due to daughter coughing. Informed Hawa Ray NP. Guaifenesin given for cough. Per Hawa Ray no chest xray will be ordered at this time. Will continue to monitor.

## 2018-09-24 NOTE — SUBJECTIVE & OBJECTIVE
Interval History: Pt was seen and examined at bedside . She states feeling better . She  Cont with mild fever and chills .     Review of Systems   Constitutional: Positive for chills and fever.   HENT: Negative for congestion, nosebleeds, sinus pressure and trouble swallowing.    Eyes: Negative for photophobia.   Respiratory: Negative for cough, chest tightness, shortness of breath and wheezing.    Cardiovascular: Negative for chest pain, palpitations and leg swelling.   Gastrointestinal: Positive for abdominal pain (mild suprapubic). Negative for constipation and diarrhea.   Endocrine: Negative for polyphagia and polyuria.   Genitourinary: Positive for flank pain. Negative for dysuria and hematuria.   Musculoskeletal: Positive for back pain and myalgias. Negative for neck pain.   Skin: Negative for color change and wound.   Allergic/Immunologic: Negative for immunocompromised state.   Neurological: Negative for dizziness, seizures, weakness and headaches.   Hematological: Negative for adenopathy. Does not bruise/bleed easily.   Psychiatric/Behavioral: Negative for confusion, hallucinations and sleep disturbance. The patient is not nervous/anxious.    All other systems reviewed and are negative.    Objective:     Vital Signs (Most Recent):  Temp: 98.9 °F (37.2 °C) (09/24/18 0913)  Pulse: 102 (09/24/18 0729)  Resp: 18 (09/24/18 0729)  BP: 110/66 (09/24/18 0729)  SpO2: 96 % (09/24/18 0729) Vital Signs (24h Range):  Temp:  [98.1 °F (36.7 °C)-104.1 °F (40.1 °C)] 98.9 °F (37.2 °C)  Pulse:  [] 102  Resp:  [16-20] 18  SpO2:  [92 %-100 %] 96 %  BP: ()/(52-66) 110/66     Weight: 82.1 kg (180 lb 16 oz)  Body mass index is 31.07 kg/m².    Intake/Output Summary (Last 24 hours) at 9/24/2018 0928  Last data filed at 9/24/2018 0913  Gross per 24 hour   Intake 6257.5 ml   Output 1300 ml   Net 4957.5 ml      Physical Exam   Constitutional: She is oriented to person, place, and time. She appears well-developed and  well-nourished. No distress.   Healthy appearing  female, in no distress, comfortably lying in bed, pleasant.  Mother and sister at the bedside.   HENT:   Head: Normocephalic and atraumatic.   Eyes: Conjunctivae and EOM are normal. No scleral icterus.   Neck: Normal range of motion. Neck supple.   Cardiovascular: Normal rate, regular rhythm and intact distal pulses.   No murmur heard.  Pulmonary/Chest: Effort normal and breath sounds normal. No respiratory distress. She has no wheezes.   Abdominal: Soft. She exhibits no distension. There is no tenderness. There is CVA tenderness (right). There is no guarding. No hernia.   Musculoskeletal: Normal range of motion. She exhibits no edema or tenderness.   Neurological: She is alert and oriented to person, place, and time. No cranial nerve deficit. She exhibits normal muscle tone. Coordination normal.   Skin: Skin is warm. Capillary refill takes less than 2 seconds. She is not diaphoretic. No erythema.   Psychiatric: She has a normal mood and affect. Her behavior is normal. Judgment and thought content normal.   Nursing note and vitals reviewed.      Significant Labs: All pertinent labs within the past 24 hours have been reviewed.    Significant Imaging: I have reviewed all pertinent imaging results/findings within the past 24 hours.

## 2018-09-24 NOTE — HOSPITAL COURSE
19 y/o wf admitted  With a dx of severe sepsis 2/2/ to pyelonephritis . Patient had history of nephrolithiasis, had a right ureteral stent placed on 9/14/18. The stent was taken out on 9/19/18 by Dr. Childs at [facility]. CT scan from 9/22/18 which show pyelonephritis . Pt was started on IV vanc and zosyn .  She had 30 cc/kg IVF  Per sepsis protocol . The blood pressure is borderline low  With a MAP > 65 . The  Lactic acid  Was 2.2 (POA) and procalcitonin level elevated (POA). Pt tempeture  Went up to 104.3 F  And trend down after  Dose of tylenol, ibuprofen and  IV Toradol .  The urine cx  Is positive for enterococcus  Sensitive to PCN . Pt remain afebrile  For more than 24 hrs . Pt was seen and examined  at bedside . She was determined  To be suitable for d/c

## 2018-09-24 NOTE — PLAN OF CARE
Sw met with pt and family at bedside to complete assessment. Sw explained role/purpose of visit. Transitional navigator was provided to pt. Sw placed contact information on white board. Pt's mother report pt is a college student in [place]. Pt's mother reports pt has been running a fever and mom has been checking pt's temperature regularly. Mom reports she will use [facility] at discharge. Pt's mother denies any dc needs at this time. Pt's pcp is Matthew Loo Ii, MD        09/24/18 [id number]   Discharge Assessment   Assessment Type Discharge Planning Assessment   Confirmed/corrected address and phone number on facesheet? Yes   Assessment information obtained from? Patient;Caregiver   Communicated expected length of stay with patient/caregiver yes   Prior to hospitilization cognitive status: Alert/Oriented   Prior to hospitalization functional status: Independent   Current cognitive status: Alert/Oriented   Current Functional Status: Independent   Lives With alone  (Student in college in [place])   Is patient able to care for self after discharge? Yes   Who are your caregiver(s) and their phone number(s)? Isabel Medina, mother, [phone]   Patient's perception of discharge disposition home or selfcare   Readmission Within The Last 30 Days no previous admission in last 30 days   Patient currently being followed by outpatient case management? No   Patient currently receives any other outside agency services? No   Equipment Currently Used at Home none   Do you have any problems affording any of your prescribed medications? No   Is the patient taking medications as prescribed? yes   Does the patient have transportation home? Yes   Transportation Available family or friend will provide   Does the patient receive services at the [facility]? No   Discharge Plan A Home   Discharge Plan B Home   Patient/Family In Agreement With Plan yes

## 2018-09-24 NOTE — PLAN OF CARE
Problem: Patient Care Overview  Goal: Plan of Care Review  Outcome: Ongoing (interventions implemented as appropriate)  Pt complains of right flank pain. Pain medication is effective. Pt complained of sever headache pain. PRN medication given and effective. Pt has remained afebrile. IV fluids are infusing. IV ABX given per order. No injuries. Will continue to monitor. 12 hour chart check is completed.

## 2018-09-24 NOTE — PROGRESS NOTES
Vancomycin Progress Notes:    Indication: pyelonephritis (seen on CT) with sepsis   Estimated Creatinine Clearance: 116.3 mL/min (based on SCr of 0.8 mg/dL).  white blood cell count = 20 trending down from 27  Tmax/24h = 104.1  Goal trough is 15-20  Cultures - urine (ENTEROCOCCUS SPECIES   10,000 - 49,999 cfu/ml   Identification and susceptibility pending   No other significant isolate )/Blood - NGTD  Patient is also on zosyn 4.5 gm iv q8hrs     Serum creatinine = 0.8 trended down from 1 yesterday  Continue current dosing of 1 gm iv q12hrs  Trough due alejo @ 1800/[facility] 9/24/2018 8:39 AM

## 2018-09-25 LAB
ANION GAP SERPL CALC-SCNC: 11 MMOL/L
BACTERIA UR CULT: NORMAL
BACTERIA UR CULT: NORMAL
BASOPHILS # BLD AUTO: 0.03 K/UL
BASOPHILS NFR BLD: 0.2 %
BUN SERPL-MCNC: 8 MG/DL
C DIFF GDH STL QL: NEGATIVE
C DIFF TOX A+B STL QL IA: NEGATIVE
CALCIUM SERPL-MCNC: 8.8 MG/DL
CHLORIDE SERPL-SCNC: 112 MMOL/L
CO2 SERPL-SCNC: 17 MMOL/L
CREAT SERPL-MCNC: 0.8 MG/DL
DIFFERENTIAL METHOD: ABNORMAL
EOSINOPHIL # BLD AUTO: 0.1 K/UL
EOSINOPHIL NFR BLD: 0.5 %
ERYTHROCYTE [DISTWIDTH] IN BLOOD BY AUTOMATED COUNT: 14.6 %
EST. GFR  (AFRICAN AMERICAN): >60 ML/MIN/1.73 M^2
EST. GFR  (NON AFRICAN AMERICAN): >60 ML/MIN/1.73 M^2
GLUCOSE SERPL-MCNC: 145 MG/DL
HCT VFR BLD AUTO: 30.5 %
HGB BLD-MCNC: 9.9 G/DL
LYMPHOCYTES # BLD AUTO: 1.8 K/UL
LYMPHOCYTES NFR BLD: 13.4 %
MCH RBC QN AUTO: 28.8 PG
MCHC RBC AUTO-ENTMCNC: 32.5 G/DL
MCV RBC AUTO: 89 FL
MONOCYTES # BLD AUTO: 0.7 K/UL
MONOCYTES NFR BLD: 5.1 %
NEUTROPHILS # BLD AUTO: 10.7 K/UL
NEUTROPHILS NFR BLD: 80.8 %
PLATELET # BLD AUTO: 244 K/UL
PMV BLD AUTO: 10.2 FL
POTASSIUM SERPL-SCNC: 3.6 MMOL/L
RBC # BLD AUTO: 3.44 M/UL
SODIUM SERPL-SCNC: 140 MMOL/L
WBC # BLD AUTO: 13.28 K/UL

## 2018-09-25 PROCEDURE — 63600175 PHARM REV CODE 636 W HCPCS: Performed by: INTERNAL MEDICINE

## 2018-09-25 PROCEDURE — 80048 BASIC METABOLIC PNL TOTAL CA: CPT

## 2018-09-25 PROCEDURE — 25000003 PHARM REV CODE 250: Performed by: INTERNAL MEDICINE

## 2018-09-25 PROCEDURE — 85025 COMPLETE CBC W/AUTO DIFF WBC: CPT

## 2018-09-25 PROCEDURE — 25000003 PHARM REV CODE 250: Performed by: HOSPITALIST

## 2018-09-25 PROCEDURE — 87324 CLOSTRIDIUM AG IA: CPT

## 2018-09-25 PROCEDURE — 21400001 HC TELEMETRY ROOM

## 2018-09-25 PROCEDURE — 36415 COLL VENOUS BLD VENIPUNCTURE: CPT

## 2018-09-25 RX ORDER — IPRATROPIUM BROMIDE AND ALBUTEROL SULFATE 2.5; .5 MG/3ML; MG/3ML
3 SOLUTION RESPIRATORY (INHALATION) EVERY 6 HOURS PRN
Status: DISCONTINUED | OUTPATIENT
Start: 2018-09-25 | End: 2018-09-26

## 2018-09-25 RX ORDER — ALPRAZOLAM 0.25 MG/1
0.25 TABLET ORAL 2 TIMES DAILY PRN
Status: DISCONTINUED | OUTPATIENT
Start: 2018-09-25 | End: 2018-09-27 | Stop reason: HOSPADM

## 2018-09-25 RX ORDER — SODIUM CHLORIDE 9 MG/ML
1000 INJECTION, SOLUTION INTRAVENOUS CONTINUOUS
Status: DISCONTINUED | OUTPATIENT
Start: 2018-09-25 | End: 2018-09-26

## 2018-09-25 RX ORDER — SODIUM CHLORIDE 9 MG/ML
1000 INJECTION, SOLUTION INTRAVENOUS CONTINUOUS
Status: DISCONTINUED | OUTPATIENT
Start: 2018-09-25 | End: 2018-09-25

## 2018-09-25 RX ADMIN — ALPRAZOLAM 0.25 MG: 0.25 TABLET ORAL at 08:09

## 2018-09-25 RX ADMIN — PIPERACILLIN AND TAZOBACTAM 4.5 G: 4; .5 INJECTION, POWDER, LYOPHILIZED, FOR SOLUTION INTRAVENOUS; PARENTERAL at 10:09

## 2018-09-25 RX ADMIN — SODIUM CHLORIDE 1000 ML: 0.9 INJECTION, SOLUTION INTRAVENOUS at 10:09

## 2018-09-25 RX ADMIN — IBUPROFEN 400 MG: 400 TABLET ORAL at 07:09

## 2018-09-25 RX ADMIN — SODIUM CHLORIDE 1000 ML: 0.9 INJECTION, SOLUTION INTRAVENOUS at 07:09

## 2018-09-25 RX ADMIN — LINEZOLID 600 MG: 600 INJECTION, SOLUTION INTRAVENOUS at 02:09

## 2018-09-25 RX ADMIN — IBUPROFEN 400 MG: 400 TABLET ORAL at 01:09

## 2018-09-25 RX ADMIN — PIPERACILLIN AND TAZOBACTAM 4.5 G: 4; .5 INJECTION, POWDER, LYOPHILIZED, FOR SOLUTION INTRAVENOUS; PARENTERAL at 02:09

## 2018-09-25 RX ADMIN — FAMOTIDINE 20 MG: 20 TABLET ORAL at 08:09

## 2018-09-25 RX ADMIN — ACETAMINOPHEN 650 MG: 325 TABLET, FILM COATED ORAL at 08:09

## 2018-09-25 RX ADMIN — AMPICILLIN SODIUM 2 G: 2 INJECTION, POWDER, FOR SOLUTION INTRAMUSCULAR; INTRAVENOUS at 04:09

## 2018-09-25 RX ADMIN — IBUPROFEN 400 MG: 400 TABLET ORAL at 10:09

## 2018-09-25 RX ADMIN — FAMOTIDINE 20 MG: 20 TABLET ORAL at 06:09

## 2018-09-25 RX ADMIN — ACETAMINOPHEN 650 MG: 325 TABLET, FILM COATED ORAL at 06:09

## 2018-09-25 RX ADMIN — AMPICILLIN SODIUM 2 G: 2 INJECTION, POWDER, FOR SOLUTION INTRAMUSCULAR; INTRAVENOUS at 08:09

## 2018-09-25 NOTE — PLAN OF CARE
Problem: Patient Care Overview  Goal: Plan of Care Review  Outcome: Ongoing (interventions implemented as appropriate)  POC reviewed, verbalized understanding. Pt remained free from falls, fall precautions in place. Pt is on monitor,. VSS. No other c/o at this time. PIV intact. Call bell and personal belongings within reach. Hourly rounding complete. Reminded to call for assistance. Will continue to monitor.

## 2018-09-25 NOTE — SUBJECTIVE & OBJECTIVE
Past Medical History:   Diagnosis Date    Asthma     exercise induced    Kidney stone        Past Surgical History:   Procedure Laterality Date    CYSTOSCOPY W/ RETROGRADES Right 9/14/2018    Procedure: CYSTOSCOPY, WITH RETROGRADE PYELOGRAM;  Surgeon: Mian Childs MD;  Location: [facility] OR [address] [place] [address];  Service: Urology;  Laterality: Right;    CYSTOSCOPY W/ URETERAL STENT PLACEMENT Right 9/14/2018    Procedure: CYSTOSCOPY, WITH URETERAL STENT INSERTION;  Surgeon: Mian Childs MD;  Location: [facility] OR [address] [place] [address];  Service: Urology;  Laterality: Right;    CYSTOSCOPY, WITH RETROGRADE PYELOGRAM Right 9/14/2018    Performed by Mian Childs MD at [facility] OR [address] [place] [address]    CYSTOSCOPY, WITH URETERAL STENT INSERTION Right 9/14/2018    Performed by Mian Childs MD at [facility] OR [address] [place] [address]    REMOVAL, CALCULUS, URETER, URETEROSCOPIC Right 9/14/2018    Performed by Mian Childs MD at [facility] OR [address] [place] [address]    URETEROSCOPIC REMOVAL OF URETERIC CALCULUS Right 9/14/2018    Procedure: REMOVAL, CALCULUS, URETER, URETEROSCOPIC;  Surgeon: Mian Childs MD;  Location: [facility] OR [address] [place] [address];  Service: Urology;  Laterality: Right;  1.5 hours    WISDOM TOOTH EXTRACTION         Review of patient's allergies indicates:  No Known Allergies    Medications:  Medications Prior to Admission   Medication Sig    acetaminophen (TYLENOL) 500 MG tablet Take 500 mg by mouth every 6 (six) hours as needed for Pain.    ISOtretinoin (ACCUTANE) 40 MG capsule Take 40 mg by mouth 2 (two) times daily.    ketorolac (TORADOL) 10 mg tablet Take 1 tablet (10 mg total) by mouth every 6 (six) hours as needed for Pain.    ondansetron (ZOFRAN) 8 MG tablet Take 1 tablet (8 mg total) by mouth every 8 (eight) hours as needed for Nausea.    oxyCODONE-acetaminophen (PERCOCET) 5-325 mg per tablet Take 1 tablet by mouth every 4 (four) hours as needed for Pain.    sulfamethoxazole-trimethoprim 800-160mg (BACTRIM DS) 800-160 mg Tab Take 1 tablet by mouth 2 (two)  times daily. for 7 days    tamsulosin (FLOMAX) 0.4 mg Cap Take 1 capsule (0.4 mg total) by mouth once daily.    VENTOLIN HFA 90 mcg/actuation inhaler INHALE TWO TO FOUR PUFFS 30 minutes prior TO exercise    acetaminophen-codeine 300-30mg (TYLENOL #3) 300-30 mg Tab Take 1 tablet by mouth every 6 (six) hours as needed. FOR SEVERE PAIN     Antibiotics (From admission, onward)    Start     Stop Route Frequency Ordered    09/24/18 1530  linezolid 600mg/300ml 600 mg/300 mL IVPB 600 mg      -- IV Every 12 hours (non-standard times) 09/24/18 1420    09/23/18 1030  piperacillin-tazobactam 4.5 g in dextrose 5 % 100 mL IVPB (ready to mix system)      -- IV Every 8 hours (non-standard times) 09/23/18 0510        Antifungals (From admission, onward)    None        Antivirals (From admission, onward)    None           There is no immunization history for the selected administration types on file for this patient.    Family History     Problem Relation (Age of Onset)    Diabetes Maternal Grandmother        Social History     Socioeconomic History    Marital status: Single     Spouse name: None    Number of children: None    Years of education: None    Highest education level: None   Social Needs    Financial resource strain: None    Food insecurity - worry: None    Food insecurity - inability: None    Transportation needs - medical: None    Transportation needs - non-medical: None   Occupational History    None   Tobacco Use    Smoking status: Never Smoker    Smokeless tobacco: Never Used   Substance and Sexual Activity    Alcohol use: No     Frequency: Never    Drug use: No    Sexual activity: No   Other Topics Concern    None   Social History Narrative    None     Review of Systems   Constitutional: Positive for chills and fever.   HENT: Negative for congestion, nosebleeds, sinus pressure and trouble swallowing.    Eyes: Negative for photophobia.   Respiratory: Negative for cough, chest tightness, shortness  of breath and wheezing.    Cardiovascular: Negative for chest pain, palpitations and leg swelling.   Gastrointestinal: Positive for abdominal pain (mild suprapubic). Negative for constipation and diarrhea.   Endocrine: Negative for polyphagia and polyuria.   Genitourinary: Positive for flank pain. Negative for dysuria and hematuria.   Musculoskeletal: Positive for back pain and myalgias. Negative for neck pain.   Skin: Negative for color change and wound.   Allergic/Immunologic: Negative for immunocompromised state.   Neurological: Negative for dizziness, seizures, weakness and headaches.   Hematological: Negative for adenopathy. Does not bruise/bleed easily.   Psychiatric/Behavioral: Negative for confusion, hallucinations and sleep disturbance. The patient is not nervous/anxious.    All other systems reviewed and are negative.    Objective:     Vital Signs (Most Recent):  Temp: 98.3 °F (36.8 °C) (09/24/18 2033)  Pulse: 80 (09/24/18 2033)  Resp: 18 (09/24/18 2033)  BP: 129/83 (09/24/18 2033)  SpO2: 98 % (09/24/18 2033) Vital Signs (24h Range):  Temp:  [98.3 °F (36.8 °C)-103.1 °F (39.5 °C)] 98.3 °F (36.8 °C)  Pulse:  [] 80  Resp:  [18-20] 18  SpO2:  [92 %-99 %] 98 %  BP: ()/(54-84) 129/83     Weight: 82.1 kg (180 lb 16 oz)  Body mass index is 31.07 kg/m².    Estimated Creatinine Clearance: 116.3 mL/min (based on SCr of 0.8 mg/dL).    Physical Exam   Constitutional: She is oriented to person, place, and time. She appears well-developed and well-nourished. No distress.   Healthy appearing  female, in no distress, comfortably lying in bed, pleasant.  Mother and sister at the bedside.   HENT:   Head: Normocephalic and atraumatic.   Eyes: Conjunctivae and EOM are normal. No scleral icterus.   Neck: Normal range of motion. Neck supple.   Cardiovascular: Regular rhythm and intact distal pulses. Tachycardia present.   No murmur heard.  Pulmonary/Chest: Effort normal and breath sounds normal. No  respiratory distress. She has no wheezes.   Abdominal: Soft. She exhibits no distension. There is no tenderness. There is CVA tenderness (right). There is no guarding. No hernia.   Musculoskeletal: Normal range of motion. She exhibits no edema or tenderness.   Neurological: She is alert and oriented to person, place, and time. No cranial nerve deficit. She exhibits normal muscle tone. Coordination normal.   Skin: Skin is warm. Capillary refill takes less than 2 seconds. She is not diaphoretic. No erythema.   Psychiatric: She has a normal mood and affect. Her behavior is normal. Judgment and thought content normal.   Nursing note and vitals reviewed.      Significant Labs:   Blood Culture:   Recent Labs   Lab  09/23/18   0010  09/23/18   0030   LABBLOO  No Growth to date  No Growth to date  No Growth to date  No Growth to date     BMP:   Recent Labs   Lab  09/24/18   0503   GLU  118*   NA  136   K  4.0   CL  111*   CO2  17*   BUN  8   CREATININE  0.8   CALCIUM  8.6*   MG  1.7     CBC:   Recent Labs   Lab  09/23/18   0034  09/24/18   0503   WBC  27.37*  20.47*   HGB  12.4  10.4*   HCT  37.0  32.6*   PLT  255  238     All pertinent labs within the past 24 hours have been reviewed.    Significant Imaging: I have reviewed all pertinent imaging results/findings within the past 24 hours.

## 2018-09-25 NOTE — ASSESSMENT & PLAN NOTE
CT abdomen/pelvis reveals mom mild perinephric stranding on the right kidney.  Continue IV antibiotics  Blood cx NGTD  Urine cx (+) for enterococcus   Continue IV fluids and supportive care.  Consult ID

## 2018-09-25 NOTE — SUBJECTIVE & OBJECTIVE
Interval History: Pt was seen and examined at bedside . She cont with high tempeture .      Review of Systems   Constitutional: Positive for chills and fever.   HENT: Negative for congestion, nosebleeds, sinus pressure and trouble swallowing.    Eyes: Negative for photophobia.   Respiratory: Negative for cough, chest tightness, shortness of breath and wheezing.    Cardiovascular: Negative for chest pain, palpitations and leg swelling.   Gastrointestinal: Positive for abdominal pain (mild suprapubic). Negative for constipation and diarrhea.   Endocrine: Negative for polyphagia and polyuria.   Genitourinary: Positive for flank pain. Negative for dysuria and hematuria.   Musculoskeletal: Positive for back pain and myalgias. Negative for neck pain.   Skin: Negative for color change and wound.   Allergic/Immunologic: Negative for immunocompromised state.   Neurological: Negative for dizziness, seizures, weakness and headaches.   Hematological: Negative for adenopathy. Does not bruise/bleed easily.   Psychiatric/Behavioral: Negative for confusion, hallucinations and sleep disturbance. The patient is not nervous/anxious.    All other systems reviewed and are negative.    Objective:     Vital Signs (Most Recent):  Temp: 98.1 °F (36.7 °C) (09/25/18 1009)  Pulse: 102 (09/25/18 0719)  Resp: 18 (09/25/18 0719)  BP: (!) 154/94 (09/25/18 0719)  SpO2: 96 % (09/25/18 0719) Vital Signs (24h Range):  Temp:  [98.1 °F (36.7 °C)-103.1 °F (39.5 °C)] 98.1 °F (36.7 °C)  Pulse:  [] 102  Resp:  [16-19] 18  SpO2:  [92 %-98 %] 96 %  BP: (112-154)/(71-94) 154/94     Weight: 87.5 kg (192 lb 14.4 oz)  Body mass index is 33.11 kg/m².    Intake/Output Summary (Last 24 hours) at 9/25/2018 1030  Last data filed at 9/25/2018 1004  Gross per 24 hour   Intake 1350 ml   Output 3950 ml   Net -2600 ml      Physical Exam   Constitutional: She is oriented to person, place, and time. She appears well-developed and well-nourished. No distress.   HENT:    Head: Normocephalic and atraumatic.   Eyes: Conjunctivae and EOM are normal. No scleral icterus.   Neck: Normal range of motion. Neck supple.   Cardiovascular: Normal rate, regular rhythm and intact distal pulses.   No murmur heard.  Pulmonary/Chest: Effort normal and breath sounds normal. No respiratory distress. She has no wheezes.   Abdominal: Soft. She exhibits no distension. There is no tenderness. There is CVA tenderness (right). There is no guarding. No hernia.   Musculoskeletal: Normal range of motion. She exhibits no edema or tenderness.   Neurological: She is alert and oriented to person, place, and time. No cranial nerve deficit. She exhibits normal muscle tone. Coordination normal.   Skin: Skin is warm. Capillary refill takes less than 2 seconds. She is not diaphoretic. No erythema.   Psychiatric: She has a normal mood and affect. Her behavior is normal. Judgment and thought content normal.   Nursing note and vitals reviewed.      Significant Labs: All pertinent labs within the past 24 hours have been reviewed.    Significant Imaging: I have reviewed all pertinent imaging results/findings within the past 24 hours.

## 2018-09-25 NOTE — ASSESSMENT & PLAN NOTE
From enterococcal UTI, will continue Zyvox for now and will deescalate therapy with final drug susceptibility

## 2018-09-25 NOTE — PROGRESS NOTES
[facility] Hospital Medicine  Progress Note    Patient Name: Jeremiah Medina  MRN: [id number]  Patient Class: IP- Inpatient   Admission Date: 9/22/2018  Length of Stay: 2 days  Attending Physician: Shahid Fuller, *  Primary Care Provider: Matthew Loo Ii, MD        Subjective:     Principal Problem:Severe sepsis    HPI:  Ms. Medina is a young 18-year-old  female student with no significant medical problems, otherwise healthy, presents to the [facility] ED today (9/23/18) complaining of fever, and right flank pain. Patient had history of nephrolithiasis, had a right ureteral stent placed on 9/14/18, the stent was taken out on 9/19/18 by Dr. Childs at [facility].  Yesterday patient presented to the [place] ED complaining of fever, she was given one dose of IV Rocephin and discharged from the ED with oral Bactrim.  Patient came to visit her mother in [place], symptoms got progressively worse, fever of 102.8, nausea, vomiting, right flank pain.    She is found to be in severe sepsis with temperature 102.8°, heart rate > 90, WBC 25,000, with 25% bands, lactic acid 2.2, procalcitonin 5.5.  Initial blood pressure 81/51, MAP 61.  Patient received 30 mL/kilogram normal saline bolus in the ED, with improvement of blood pressure to 99/58, MAP 75.  Blood and urine cultures were obtained.  Started on IV Zosyn and vancomycin empirically. Preliminary reading of CT abdomen pelvis without contrast  reveals no renal calculus. No hydronephrosis. Subtle right perinephric stranding consistent with pyelonephritis.         Hospital Course:  19 y/o wf admitted  With a dx of severe sepsis 2/2/ to pyelonephritis . Patient had history of nephrolithiasis, had a right ureteral stent placed on 9/14/18. The stent was taken out on 9/19/18 by Dr. Childs at [facility]. CT scan from 9/22/18 which show pyelonephritis . Pt was started on IV vanc and zosyn .  She  had 30 cc/kg IVF  Per sepsis protocol . The blood pressure is borderline low  With a MAP > 65 . The  Lactic acid  Was 2.2 (POA) and procalcitonin level elevated (POA). Pt tempeture  Went up to 104.3 F  And trend down after  Dose of tylenol, ibuprofen and  IV Toradol .  The urine cx  Is positive for enterococcus         Interval History: Pt was seen and examined at bedside . She cont with high tempeture .      Review of Systems   Constitutional: Positive for chills and fever.   HENT: Negative for congestion, nosebleeds, sinus pressure and trouble swallowing.    Eyes: Negative for photophobia.   Respiratory: Negative for cough, chest tightness, shortness of breath and wheezing.    Cardiovascular: Negative for chest pain, palpitations and leg swelling.   Gastrointestinal: Positive for abdominal pain (mild suprapubic). Negative for constipation and diarrhea.   Endocrine: Negative for polyphagia and polyuria.   Genitourinary: Positive for flank pain. Negative for dysuria and hematuria.   Musculoskeletal: Positive for back pain and myalgias. Negative for neck pain.   Skin: Negative for color change and wound.   Allergic/Immunologic: Negative for immunocompromised state.   Neurological: Negative for dizziness, seizures, weakness and headaches.   Hematological: Negative for adenopathy. Does not bruise/bleed easily.   Psychiatric/Behavioral: Negative for confusion, hallucinations and sleep disturbance. The patient is not nervous/anxious.    All other systems reviewed and are negative.    Objective:     Vital Signs (Most Recent):  Temp: 98.1 °F (36.7 °C) (09/25/18 1009)  Pulse: 102 (09/25/18 0719)  Resp: 18 (09/25/18 0719)  BP: (!) 154/94 (09/25/18 0719)  SpO2: 96 % (09/25/18 0719) Vital Signs (24h Range):  Temp:  [98.1 °F (36.7 °C)-103.1 °F (39.5 °C)] 98.1 °F (36.7 °C)  Pulse:  [] 102  Resp:  [16-19] 18  SpO2:  [92 %-98 %] 96 %  BP: (112-154)/(71-94) 154/94     Weight: 87.5 kg (192 lb 14.4 oz)  Body mass index is 33.11  kg/m².    Intake/Output Summary (Last 24 hours) at 9/25/2018 1030  Last data filed at 9/25/2018 1004  Gross per 24 hour   Intake 1350 ml   Output 3950 ml   Net -2600 ml      Physical Exam   Constitutional: She is oriented to person, place, and time. She appears well-developed and well-nourished. No distress.   HENT:   Head: Normocephalic and atraumatic.   Eyes: Conjunctivae and EOM are normal. No scleral icterus.   Neck: Normal range of motion. Neck supple.   Cardiovascular: Normal rate, regular rhythm and intact distal pulses.   No murmur heard.  Pulmonary/Chest: Effort normal and breath sounds normal. No respiratory distress. She has no wheezes.   Abdominal: Soft. She exhibits no distension. There is no tenderness. There is CVA tenderness (right). There is no guarding. No hernia.   Musculoskeletal: Normal range of motion. She exhibits no edema or tenderness.   Neurological: She is alert and oriented to person, place, and time. No cranial nerve deficit. She exhibits normal muscle tone. Coordination normal.   Skin: Skin is warm. Capillary refill takes less than 2 seconds. She is not diaphoretic. No erythema.   Psychiatric: She has a normal mood and affect. Her behavior is normal. Judgment and thought content normal.   Nursing note and vitals reviewed.      Significant Labs: All pertinent labs within the past 24 hours have been reviewed.    Significant Imaging: I have reviewed all pertinent imaging results/findings within the past 24 hours.    Assessment/Plan:      * Severe sepsis    (+) urine  cx   Enterococcus  Cont IVF  And  IVAB  Keep MAP > 65             Pyelonephritis    Cont as above         Acute pyelonephritis    CT abdomen/pelvis reveals mom mild perinephric stranding on the right kidney.  Continue IV antibiotics  Blood cx NGTD  Urine cx (+) for enterococcus   Continue IV fluids and supportive care.  Consult ID             VTE Risk Mitigation (From admission, onward)        Ordered     Place sequential  compression device  Until discontinued      09/23/18 8040              Shahid Fuller MD  Department of Hospital Medicine   [facility]

## 2018-09-25 NOTE — HPI
18-year-old woman who was admitted with fever and right flank pain .She has history of nephrolithiasis with recent right ureteral stent placed on 9/14/18, the stent was taken out on 9/19/18 by Dr. Childs at [facility].    Since admission, she was noted to have T max of 103, wbc -25k, CT scan of the pelvis -right perinephric stranding consistent with pyelonephritis.  She was given bactrim prescription and one dose of Rocephin prior to presentation .  Urine culture- enterococcus. She was seen by mother and sister at bedside.

## 2018-09-25 NOTE — PLAN OF CARE
Problem: Patient Care Overview  Goal: Plan of Care Review  Outcome: Ongoing (interventions implemented as appropriate)  Patient remains free from falls or injury this shift, safety measures in place. CDiff negative this shift, isolation precautions removed. Elevated temp this AM, treated with ibuprofen and cooling blanket. VS stable, temp within normal limits, normal sinus rhythm on the monitor. Fluids running, medications administered per order. Patient tolerating treatment. Mother remains at the bedside. Call light and belongings within reach. Will continue to monitor.

## 2018-09-25 NOTE — CONSULTS
[facility]  Infectious Disease  Consult Note    Patient Name: Jeremiah Medina  MRN: [id number]  Admission Date: 9/22/2018  Hospital Length of Stay: 2 days  Attending Physician: Shahid Fuller, *  Primary Care Provider: Matthew Loo Ii, MD     Isolation Status: Special Contact    Patient information was obtained from patient, past medical records and ER records.      Consults  Assessment/Plan:     * Severe sepsis    From enterococcal UTI, will continue Zyvox for now and will deescalate therapy with final drug susceptibility          Acute pyelonephritis    Urine culture -enterococcus -will use Zyvox for 2 days and will adjust therapy if no VRE            Thank you for your consult. I will follow-up with patient. Please contact us if you have any additional questions.    Chris Carlin MD  Infectious Disease  [facility]    Subjective:     Principal Problem: Severe sepsis    HPI: 18-year-old woman who was admitted with fever and right flank pain .She has history of nephrolithiasis with recent right ureteral stent placed on 9/14/18, the stent was taken out on 9/19/18 by Dr. Childs at [facility].    Since admission, she was noted to have T max of 103, wbc -25k, CT scan of the pelvis -right perinephric stranding consistent with pyelonephritis.  She was given bactrim prescription and one dose of Rocephin prior to presentation .  Urine culture- enterococcus. She was seen by mother and sister at bedside.        Past Medical History:   Diagnosis Date    Asthma     exercise induced    Kidney stone        Past Surgical History:   Procedure Laterality Date    CYSTOSCOPY W/ RETROGRADES Right 9/14/2018    Procedure: CYSTOSCOPY, WITH RETROGRADE PYELOGRAM;  Surgeon: Mian Childs MD;  Location: [facility] OR [address] [place] [address];  Service: Urology;  Laterality: Right;    CYSTOSCOPY W/ URETERAL STENT PLACEMENT Right 9/14/2018    Procedure: CYSTOSCOPY, WITH URETERAL STENT INSERTION;   Surgeon: Mian Childs MD;  Location: [facility] OR [address] [place] [address];  Service: Urology;  Laterality: Right;    CYSTOSCOPY, WITH RETROGRADE PYELOGRAM Right 9/14/2018    Performed by Mian Childs MD at [facility] OR [address] [place] [address]    CYSTOSCOPY, WITH URETERAL STENT INSERTION Right 9/14/2018    [facility]    REMOVAL, CALCULUS, URETER, URETEROSCOPIC Right 9/14/2018    [facility]    URETEROSCOPIC REMOVAL OF URETERIC CALCULUS Right 9/14/2018    Procedure: REMOVAL, CALCULUS, URETER, URETEROSCOPIC;  Surgeon: Mian Childs MD;  Location: [facility] OR [address] [place] [address];  Service: Urology;  Laterality: Right;  1.5 hours    WISDOM TOOTH EXTRACTION         Review of patient's allergies indicates:  No Known Allergies    Medications:  Medications Prior to Admission   Medication Sig    acetaminophen (TYLENOL) 500 MG tablet Take 500 mg by mouth every 6 (six) hours as needed for Pain.    ISOtretinoin (ACCUTANE) 40 MG capsule Take 40 mg by mouth 2 (two) times daily.    ketorolac (TORADOL) 10 mg tablet Take 1 tablet (10 mg total) by mouth every 6 (six) hours as needed for Pain.    ondansetron (ZOFRAN) 8 MG tablet Take 1 tablet (8 mg total) by mouth every 8 (eight) hours as needed for Nausea.    oxyCODONE-acetaminophen (PERCOCET) 5-325 mg per tablet Take 1 tablet by mouth every 4 (four) hours as needed for Pain.    sulfamethoxazole-trimethoprim 800-160mg (BACTRIM DS) 800-160 mg Tab Take 1 tablet by mouth 2 (two) times daily. for 7 days    tamsulosin (FLOMAX) 0.4 mg Cap Take 1 capsule (0.4 mg total) by mouth once daily.    VENTOLIN HFA 90 mcg/actuation inhaler INHALE TWO TO FOUR PUFFS 30 minutes prior TO exercise    acetaminophen-codeine 300-30mg (TYLENOL #3) 300-30 mg Tab Take 1 tablet by mouth every 6 (six) hours as needed. FOR SEVERE PAIN     Antibiotics (From admission, onward)    Start     Stop Route Frequency Ordered    09/24/18 1530  linezolid 600mg/300ml  600 mg/300 mL IVPB 600 mg      -- IV Every 12 hours (non-standard times) 09/24/18 1420    09/23/18 1030  piperacillin-tazobactam 4.5 g in dextrose 5 % 100 mL IVPB (ready to mix system)      -- IV Every 8 hours (non-standard times) 09/23/18 0510        Antifungals (From admission, onward)    None        Antivirals (From admission, onward)    None           There is no immunization history for the selected administration types on file for this patient.    Family History     Problem Relation (Age of Onset)    Diabetes Maternal Grandmother        Social History     Socioeconomic History    Marital status: Single     Spouse name: None    Number of children: None    Years of education: None    Highest education level: None   Social Needs    Financial resource strain: None    Food insecurity - worry: None    Food insecurity - inability: None    Transportation needs - medical: None    Transportation needs - non-medical: None   Occupational History    None   Tobacco Use    Smoking status: Never Smoker    Smokeless tobacco: Never Used   Substance and Sexual Activity    Alcohol use: No     Frequency: Never    Drug use: No    Sexual activity: No   Other Topics Concern    None   Social History Narrative    None     Review of Systems   Constitutional: Positive for chills and fever.   HENT: Negative for congestion, nosebleeds, sinus pressure and trouble swallowing.    Eyes: Negative for photophobia.   Respiratory: Negative for cough, chest tightness, shortness of breath and wheezing.    Cardiovascular: Negative for chest pain, palpitations and leg swelling.   Gastrointestinal: Positive for abdominal pain (mild suprapubic). Negative for constipation and diarrhea.   Endocrine: Negative for polyphagia and polyuria.   Genitourinary: Positive for flank pain. Negative for dysuria and hematuria.   Musculoskeletal: Positive for back pain and myalgias. Negative for neck pain.   Skin: Negative for color change and wound.    Allergic/Immunologic: Negative for immunocompromised state.   Neurological: Negative for dizziness, seizures, weakness and headaches.   Hematological: Negative for adenopathy. Does not bruise/bleed easily.   Psychiatric/Behavioral: Negative for confusion, hallucinations and sleep disturbance. The patient is not nervous/anxious.    All other systems reviewed and are negative.    Objective:     Vital Signs (Most Recent):  Temp: 98.3 °F (36.8 °C) (09/24/18 2033)  Pulse: 80 (09/24/18 2033)  Resp: 18 (09/24/18 2033)  BP: 129/83 (09/24/18 2033)  SpO2: 98 % (09/24/18 2033) Vital Signs (24h Range):  Temp:  [98.3 °F (36.8 °C)-103.1 °F (39.5 °C)] 98.3 °F (36.8 °C)  Pulse:  [] 80  Resp:  [18-20] 18  SpO2:  [92 %-99 %] 98 %  BP: ()/(54-84) 129/83     Weight: 82.1 kg (180 lb 16 oz)  Body mass index is 31.07 kg/m².    Estimated Creatinine Clearance: 116.3 mL/min (based on SCr of 0.8 mg/dL).    Physical Exam   Constitutional: She is oriented to person, place, and time. She appears well-developed and well-nourished. No distress.   Healthy appearing  female, in no distress, comfortably lying in bed, pleasant.  Mother and sister at the bedside.   HENT:   Head: Normocephalic and atraumatic.   Eyes: Conjunctivae and EOM are normal. No scleral icterus.   Neck: Normal range of motion. Neck supple.   Cardiovascular: Regular rhythm and intact distal pulses. Tachycardia present.   No murmur heard.  Pulmonary/Chest: Effort normal and breath sounds normal. No respiratory distress. She has no wheezes.   Abdominal: Soft. She exhibits no distension. There is no tenderness. There is CVA tenderness (right). There is no guarding. No hernia.   Musculoskeletal: Normal range of motion. She exhibits no edema or tenderness.   Neurological: She is alert and oriented to person, place, and time. No cranial nerve deficit. She exhibits normal muscle tone. Coordination normal.   Skin: Skin is warm. Capillary refill takes less than 2  seconds. She is not diaphoretic. No erythema.   Psychiatric: She has a normal mood and affect. Her behavior is normal. Judgment and thought content normal.   Nursing note and vitals reviewed.      Significant Labs:   Blood Culture:   Recent Labs   Lab  09/23/18   0010  09/23/18   0030   LABBLOO  No Growth to date  No Growth to date  No Growth to date  No Growth to date     BMP:   Recent Labs   Lab  09/24/18   0503   GLU  118*   NA  136   K  4.0   CL  111*   CO2  17*   BUN  8   CREATININE  0.8   CALCIUM  8.6*   MG  1.7     CBC:   Recent Labs   Lab  09/23/18   0034  09/24/18   0503   WBC  27.37*  20.47*   HGB  12.4  10.4*   HCT  37.0  32.6*   PLT  255  238     All pertinent labs within the past 24 hours have been reviewed.    Significant Imaging: I have reviewed all pertinent imaging results/findings within the past 24 hours.

## 2018-09-25 NOTE — PLAN OF CARE
Problem: Fall Risk (Adult)  Goal: Absence of Falls  Patient will demonstrate the desired outcomes by discharge/transition of care.  Outcome: Ongoing (interventions implemented as appropriate)  No falls

## 2018-09-26 ENCOUNTER — PATIENT MESSAGE (OUTPATIENT)
Dept: UROLOGY | Facility: CLINIC | Age: 18
End: 2018-09-26

## 2018-09-26 LAB
BASOPHILS # BLD AUTO: 0.03 K/UL
BASOPHILS NFR BLD: 0.3 %
DIFFERENTIAL METHOD: ABNORMAL
EOSINOPHIL # BLD AUTO: 0.2 K/UL
EOSINOPHIL NFR BLD: 1.6 %
ERYTHROCYTE [DISTWIDTH] IN BLOOD BY AUTOMATED COUNT: 14.6 %
HCT VFR BLD AUTO: 29.4 %
HGB BLD-MCNC: 9.6 G/DL
LYMPHOCYTES # BLD AUTO: 2.5 K/UL
LYMPHOCYTES NFR BLD: 26.9 %
MCH RBC QN AUTO: 28.7 PG
MCHC RBC AUTO-ENTMCNC: 32.7 G/DL
MCV RBC AUTO: 88 FL
MONOCYTES # BLD AUTO: 1 K/UL
MONOCYTES NFR BLD: 10.8 %
NEUTROPHILS # BLD AUTO: 5.6 K/UL
NEUTROPHILS NFR BLD: 60.4 %
PLATELET # BLD AUTO: 244 K/UL
PMV BLD AUTO: 10.3 FL
RBC # BLD AUTO: 3.35 M/UL
WBC # BLD AUTO: 9.27 K/UL

## 2018-09-26 PROCEDURE — 25000003 PHARM REV CODE 250: Performed by: HOSPITALIST

## 2018-09-26 PROCEDURE — 25000003 PHARM REV CODE 250: Performed by: INTERNAL MEDICINE

## 2018-09-26 PROCEDURE — 63600175 PHARM REV CODE 636 W HCPCS: Performed by: INTERNAL MEDICINE

## 2018-09-26 PROCEDURE — 94760 N-INVAS EAR/PLS OXIMETRY 1: CPT

## 2018-09-26 PROCEDURE — 25000242 PHARM REV CODE 250 ALT 637 W/ HCPCS: Performed by: INTERNAL MEDICINE

## 2018-09-26 PROCEDURE — 85025 COMPLETE CBC W/AUTO DIFF WBC: CPT

## 2018-09-26 PROCEDURE — 94640 AIRWAY INHALATION TREATMENT: CPT

## 2018-09-26 PROCEDURE — 36415 COLL VENOUS BLD VENIPUNCTURE: CPT

## 2018-09-26 PROCEDURE — 21400001 HC TELEMETRY ROOM

## 2018-09-26 RX ORDER — IPRATROPIUM BROMIDE AND ALBUTEROL SULFATE 2.5; .5 MG/3ML; MG/3ML
3 SOLUTION RESPIRATORY (INHALATION) EVERY 8 HOURS
Status: DISCONTINUED | OUTPATIENT
Start: 2018-09-26 | End: 2018-09-27 | Stop reason: HOSPADM

## 2018-09-26 RX ORDER — SODIUM,POTASSIUM PHOSPHATES 280-250MG
1 POWDER IN PACKET (EA) ORAL
Status: DISCONTINUED | OUTPATIENT
Start: 2018-09-26 | End: 2018-09-27 | Stop reason: HOSPADM

## 2018-09-26 RX ADMIN — IPRATROPIUM BROMIDE AND ALBUTEROL SULFATE 3 ML: .5; 3 SOLUTION RESPIRATORY (INHALATION) at 11:09

## 2018-09-26 RX ADMIN — AMPICILLIN SODIUM 2 G: 2 INJECTION, POWDER, FOR SOLUTION INTRAMUSCULAR; INTRAVENOUS at 07:09

## 2018-09-26 RX ADMIN — AMPICILLIN SODIUM 2 G: 2 INJECTION, POWDER, FOR SOLUTION INTRAMUSCULAR; INTRAVENOUS at 12:09

## 2018-09-26 RX ADMIN — ONDANSETRON 4 MG: 2 INJECTION, SOLUTION INTRAMUSCULAR; INTRAVENOUS at 08:09

## 2018-09-26 RX ADMIN — ALPRAZOLAM 0.25 MG: 0.25 TABLET ORAL at 08:09

## 2018-09-26 RX ADMIN — POTASSIUM & SODIUM PHOSPHATES POWDER PACK 280-160-250 MG 1 PACKET: 280-160-250 PACK at 09:09

## 2018-09-26 RX ADMIN — ACETAMINOPHEN 650 MG: 325 TABLET, FILM COATED ORAL at 05:09

## 2018-09-26 RX ADMIN — FAMOTIDINE 20 MG: 20 TABLET ORAL at 09:09

## 2018-09-26 RX ADMIN — AMPICILLIN SODIUM 2 G: 2 INJECTION, POWDER, FOR SOLUTION INTRAMUSCULAR; INTRAVENOUS at 09:09

## 2018-09-26 RX ADMIN — IPRATROPIUM BROMIDE AND ALBUTEROL SULFATE 3 ML: .5; 3 SOLUTION RESPIRATORY (INHALATION) at 04:09

## 2018-09-26 RX ADMIN — AMPICILLIN SODIUM 2 G: 2 INJECTION, POWDER, FOR SOLUTION INTRAMUSCULAR; INTRAVENOUS at 11:09

## 2018-09-26 RX ADMIN — POTASSIUM & SODIUM PHOSPHATES POWDER PACK 280-160-250 MG 1 PACKET: 280-160-250 PACK at 04:09

## 2018-09-26 RX ADMIN — AMPICILLIN SODIUM 2 G: 2 INJECTION, POWDER, FOR SOLUTION INTRAMUSCULAR; INTRAVENOUS at 03:09

## 2018-09-26 RX ADMIN — FAMOTIDINE 20 MG: 20 TABLET ORAL at 08:09

## 2018-09-26 RX ADMIN — AMPICILLIN SODIUM 2 G: 2 INJECTION, POWDER, FOR SOLUTION INTRAMUSCULAR; INTRAVENOUS at 04:09

## 2018-09-26 RX ADMIN — IBUPROFEN 400 MG: 400 TABLET ORAL at 08:09

## 2018-09-26 RX ADMIN — ALPRAZOLAM 0.25 MG: 0.25 TABLET ORAL at 09:09

## 2018-09-26 RX ADMIN — ACETAMINOPHEN 650 MG: 325 TABLET, FILM COATED ORAL at 07:09

## 2018-09-26 NOTE — PLAN OF CARE
Problem: Patient Care Overview  Goal: Plan of Care Review  Outcome: Ongoing (interventions implemented as appropriate)  Pt remained free of injury during shift, stable condition, pain adequately controlled with PRN medication, had fever during evening and resolved with administration of PRN antipyretics, no acute distress, receiving IV fluids, receiving antibiotics, receiving breathing treatments through respiratory therapy, on cardiac monitoring (SR, HR 60-80s), and will continue to monitor. 24hr chart review performed.

## 2018-09-26 NOTE — SUBJECTIVE & OBJECTIVE
Interval History: Pt was seen and examined at bedside    Review of Systems   Constitutional: Positive for fever. Negative for chills.   HENT: Negative for congestion, nosebleeds, sinus pressure and trouble swallowing.    Eyes: Negative for photophobia.   Respiratory: Negative for cough, chest tightness, shortness of breath and wheezing.    Cardiovascular: Negative for chest pain, palpitations and leg swelling.   Gastrointestinal: Negative for abdominal pain (mild suprapubic), constipation and diarrhea.   Endocrine: Negative for polyphagia and polyuria.   Genitourinary: Positive for flank pain. Negative for dysuria and hematuria.   Musculoskeletal: Negative for back pain, myalgias and neck pain.   Skin: Negative for color change and wound.   Allergic/Immunologic: Negative for immunocompromised state.   Neurological: Negative for dizziness, seizures, weakness and headaches.   Hematological: Negative for adenopathy. Does not bruise/bleed easily.   Psychiatric/Behavioral: Negative for confusion, hallucinations and sleep disturbance. The patient is not nervous/anxious.    All other systems reviewed and are negative.    Objective:     Vital Signs (Most Recent):  Temp: 98.5 °F (36.9 °C) (09/26/18 0944)  Pulse: 77 (09/26/18 0900)  Resp: 20 (09/26/18 0728)  BP: (!) 137/92 (09/26/18 0728)  SpO2: (!) 90 % (09/26/18 0728) Vital Signs (24h Range):  Temp:  [96.8 °F (36 °C)-101.9 °F (38.8 °C)] 98.5 °F (36.9 °C)  Pulse:  [] 77  Resp:  [16-20] 20  SpO2:  [90 %-98 %] 90 %  BP: (110-149)/(67-92) 137/92     Weight: 87.5 kg (192 lb 14.4 oz)  Body mass index is 33.11 kg/m².    Intake/Output Summary (Last 24 hours) at 9/26/2018 1103  Last data filed at 9/26/2018 0800  Gross per 24 hour   Intake 867.5 ml   Output 2551 ml   Net -1683.5 ml      Physical Exam   Constitutional: She is oriented to person, place, and time. She appears well-developed and well-nourished. No distress.   HENT:   Head: Normocephalic and atraumatic.   Eyes:  Conjunctivae and EOM are normal. No scleral icterus.   Neck: Normal range of motion. Neck supple.   Cardiovascular: Normal rate, regular rhythm and intact distal pulses.   No murmur heard.  Pulmonary/Chest: Effort normal and breath sounds normal. No respiratory distress. She has no wheezes.   Abdominal: Soft. She exhibits no distension. There is no tenderness. There is CVA tenderness (right). There is no guarding. No hernia.   Musculoskeletal: Normal range of motion. She exhibits no edema or tenderness.   Neurological: She is alert and oriented to person, place, and time. No cranial nerve deficit. She exhibits normal muscle tone. Coordination normal.   Skin: Skin is warm. Capillary refill takes less than 2 seconds. She is not diaphoretic. No erythema.   Psychiatric: She has a normal mood and affect. Her behavior is normal. Judgment and thought content normal.   Nursing note and vitals reviewed.      Significant Labs: All pertinent labs within the past 24 hours have been reviewed.    Significant Imaging: I have reviewed all pertinent imaging results/findings within the past 24 hours.

## 2018-09-26 NOTE — PROGRESS NOTES
[facility] Hospital Medicine  Progress Note    Patient Name: Jeremiah Medina  MRN: [id number]  Patient Class: IP- Inpatient   Admission Date: 9/22/2018  Length of Stay: 3 days  Attending Physician: Shahid Fuller, *  Primary Care Provider: Matthew Loo Ii, MD        Subjective:     Principal Problem:Severe sepsis    HPI:  Ms. Medina is a young 18-year-old  female student with no significant medical problems, otherwise healthy, presents to the [facility] ED today (9/23/18) complaining of fever, and right flank pain. Patient had history of nephrolithiasis, had a right ureteral stent placed on 9/14/18, the stent was taken out on 9/19/18 by Dr. Childs at [facility].  Yesterday patient presented to the [place] ED complaining of fever, she was given one dose of IV Rocephin and discharged from the ED with oral Bactrim.  Patient came to visit her mother in [place], symptoms got progressively worse, fever of 102.8, nausea, vomiting, right flank pain.    She is found to be in severe sepsis with temperature 102.8°, heart rate > 90, WBC 25,000, with 25% bands, lactic acid 2.2, procalcitonin 5.5.  Initial blood pressure 81/51, MAP 61.  Patient received 30 mL/kilogram normal saline bolus in the ED, with improvement of blood pressure to 99/58, MAP 75.  Blood and urine cultures were obtained.  Started on IV Zosyn and vancomycin empirically. Preliminary reading of CT abdomen pelvis without contrast  reveals no renal calculus. No hydronephrosis. Subtle right perinephric stranding consistent with pyelonephritis.         Hospital Course:  19 y/o wf admitted  With a dx of severe sepsis 2/2/ to pyelonephritis . Patient had history of nephrolithiasis, had a right ureteral stent placed on 9/14/18. The stent was taken out on 9/19/18 by Dr. Childs at [facility]. CT scan from 9/22/18 which show pyelonephritis . Pt was started on IV vanc and zosyn .  She  had 30 cc/kg IVF  Per sepsis protocol . The blood pressure is borderline low  With a MAP > 65 . The  Lactic acid  Was 2.2 (POA) and procalcitonin level elevated (POA). Pt tempeture  Went up to 104.3 F  And trend down after  Dose of tylenol, ibuprofen and  IV Toradol .  The urine cx  Is positive for enterococcus  Sensitive to PCN .        Interval History: Pt was seen and examined at bedside    Review of Systems   Constitutional: Positive for fever. Negative for chills.   HENT: Negative for congestion, nosebleeds, sinus pressure and trouble swallowing.    Eyes: Negative for photophobia.   Respiratory: Negative for cough, chest tightness, shortness of breath and wheezing.    Cardiovascular: Negative for chest pain, palpitations and leg swelling.   Gastrointestinal: Negative for abdominal pain (mild suprapubic), constipation and diarrhea.   Endocrine: Negative for polyphagia and polyuria.   Genitourinary: Positive for flank pain. Negative for dysuria and hematuria.   Musculoskeletal: Negative for back pain, myalgias and neck pain.   Skin: Negative for color change and wound.   Allergic/Immunologic: Negative for immunocompromised state.   Neurological: Negative for dizziness, seizures, weakness and headaches.   Hematological: Negative for adenopathy. Does not bruise/bleed easily.   Psychiatric/Behavioral: Negative for confusion, hallucinations and sleep disturbance. The patient is not nervous/anxious.    All other systems reviewed and are negative.    Objective:     Vital Signs (Most Recent):  Temp: 98.5 °F (36.9 °C) (09/26/18 0944)  Pulse: 77 (09/26/18 0900)  Resp: 20 (09/26/18 0728)  BP: (!) 137/92 (09/26/18 0728)  SpO2: (!) 90 % (09/26/18 0728) Vital Signs (24h Range):  Temp:  [96.8 °F (36 °C)-101.9 °F (38.8 °C)] 98.5 °F (36.9 °C)  Pulse:  [] 77  Resp:  [16-20] 20  SpO2:  [90 %-98 %] 90 %  BP: (110-149)/(67-92) 137/92     Weight: 87.5 kg (192 lb 14.4 oz)  Body mass index is 33.11 kg/m².    Intake/Output Summary  (Last 24 hours) at 9/26/2018 1103  Last data filed at 9/26/2018 0800  Gross per 24 hour   Intake 867.5 ml   Output 2551 ml   Net -1683.5 ml      Physical Exam   Constitutional: She is oriented to person, place, and time. She appears well-developed and well-nourished. No distress.   HENT:   Head: Normocephalic and atraumatic.   Eyes: Conjunctivae and EOM are normal. No scleral icterus.   Neck: Normal range of motion. Neck supple.   Cardiovascular: Normal rate, regular rhythm and intact distal pulses.   No murmur heard.  Pulmonary/Chest: Effort normal and breath sounds normal. No respiratory distress. She has no wheezes.   Abdominal: Soft. She exhibits no distension. There is no tenderness. There is CVA tenderness (right). There is no guarding. No hernia.   Musculoskeletal: Normal range of motion. She exhibits no edema or tenderness.   Neurological: She is alert and oriented to person, place, and time. No cranial nerve deficit. She exhibits normal muscle tone. Coordination normal.   Skin: Skin is warm. Capillary refill takes less than 2 seconds. She is not diaphoretic. No erythema.   Psychiatric: She has a normal mood and affect. Her behavior is normal. Judgment and thought content normal.   Nursing note and vitals reviewed.      Significant Labs: All pertinent labs within the past 24 hours have been reviewed.    Significant Imaging: I have reviewed all pertinent imaging results/findings within the past 24 hours.    Assessment/Plan:      * Severe sepsis    (+) urine  cx   Enterococcus  Cont IVF  And  IVAB  Keep MAP > 65             Pyelonephritis    Cont as above         Acute pyelonephritis    CT abdomen/pelvis reveals mom mild perinephric stranding on the right kidney.  Continue IV antibiotics  Blood cx NGTD  Urine cx (+) for enterococcus   Continue IV fluids and supportive care.  Consult ID             VTE Risk Mitigation (From admission, onward)        Ordered     Place sequential compression device  Until  discontinued      09/23/18 0510              Shahid Fuller MD  Department of Hospital Medicine   [facility]

## 2018-09-26 NOTE — ASSESSMENT & PLAN NOTE
From enterococcal UTI, will continue Zyvox for now and will deescalate therapy with final drug susceptibility      09/25- isolate is ampicillin sensitive-will use ampicillin and will plan to use PO Augumentin on discharge.

## 2018-09-26 NOTE — PROGRESS NOTES
[facility]  Infectious Disease  Progress Note    Patient Name: Jeremiah Medina  MRN: [id number]  Admission Date: 9/22/2018  Length of Stay: 3 days  Attending Physician: Shahid Fuller, *  Primary Care Provider: Matthew Loo Ii, MD    Isolation Status: No active isolations  Assessment/Plan:      * Severe sepsis    From enterococcal UTI, will continue Zyvox for now and will deescalate therapy with final drug susceptibility      09/25- isolate is ampicillin sensitive-will use ampicillin and will plan to use PO Augumentin on discharge.        Acute pyelonephritis    Urine culture -enterococcus -will use Zyvox for 2 days and will adjust therapy if no VRE      09/25- will continue IV Ampicillin for now, stop zyvox.  Fever might still be present even on adequate therapy .            Anticipated Disposition:     Thank you for your consult. I will follow-up with patient. Please contact us if you have any additional questions.    Chris Carlin MD  Infectious Disease  [facility]    Subjective:     Principal Problem:Severe sepsis    HPI: 18-year-old woman who was admitted with fever and right flank pain .She has history of nephrolithiasis with recent right ureteral stent placed on 9/14/18, the stent was taken out on 9/19/18 by Dr. Childs at [facility].    Since admission, she was noted to have T max of 103, wbc -25k, CT scan of the pelvis -right perinephric stranding consistent with pyelonephritis.  She was given bactrim prescription and one dose of Rocephin prior to presentation .  Urine culture- enterococcus. She was seen by mother and sister at bedside.      Interval History:  18 year old woman with enterococcal UTI.  Isolate is not VRE and sensitive to Ampicillin.    Review of Systems   Constitutional: Positive for chills and fever.   HENT: Negative for congestion, nosebleeds, sinus pressure and trouble swallowing.    Eyes: Negative for photophobia.   Respiratory:  Negative for cough, chest tightness, shortness of breath and wheezing.    Cardiovascular: Negative for chest pain, palpitations and leg swelling.   Gastrointestinal: Positive for abdominal pain (mild suprapubic). Negative for constipation and diarrhea.   Endocrine: Negative for polyphagia and polyuria.   Genitourinary: Positive for flank pain. Negative for dysuria and hematuria.   Musculoskeletal: Positive for back pain and myalgias. Negative for neck pain.   Skin: Negative for color change and wound.   Allergic/Immunologic: Negative for immunocompromised state.   Neurological: Negative for dizziness, seizures, weakness and headaches.   Hematological: Negative for adenopathy. Does not bruise/bleed easily.   Psychiatric/Behavioral: Negative for confusion, hallucinations and sleep disturbance. The patient is not nervous/anxious.    All other systems reviewed and are negative.    Objective:     Vital Signs (Most Recent):  Temp: 96.8 °F (36 °C) (09/26/18 0342)  Pulse: (!) 59 (09/26/18 0440)  Resp: 18 (09/26/18 0342)  BP: 135/78 (09/26/18 0342)  SpO2: (!) 93 % (09/26/18 0342) Vital Signs (24h Range):  Temp:  [96.8 °F (36 °C)-102 °F (38.9 °C)] 96.8 °F (36 °C)  Pulse:  [] 59  Resp:  [16-18] 18  SpO2:  [91 %-98 %] 93 %  BP: (110-154)/(67-94) 135/78     Weight: 87.5 kg (192 lb 14.4 oz)  Body mass index is 33.11 kg/m².    Estimated Creatinine Clearance: 120.1 mL/min (based on SCr of 0.8 mg/dL).    Physical Exam   Constitutional: She is oriented to person, place, and time. She appears well-developed and well-nourished. No distress.   Healthy appearing  female, in no distress, comfortably lying in bed, pleasant.  Mother and sister at the bedside.   HENT:   Head: Normocephalic and atraumatic.   Eyes: Conjunctivae and EOM are normal. No scleral icterus.   Neck: Normal range of motion. Neck supple.   Cardiovascular: Regular rhythm and intact distal pulses. Tachycardia present.   No murmur heard.  Pulmonary/Chest:  Effort normal and breath sounds normal. No respiratory distress. She has no wheezes.   Abdominal: Soft. She exhibits no distension. There is no tenderness. There is CVA tenderness (right). There is no guarding. No hernia.   Musculoskeletal: Normal range of motion. She exhibits no edema or tenderness.   Neurological: She is alert and oriented to person, place, and time. No cranial nerve deficit. She exhibits normal muscle tone. Coordination normal.   Skin: Skin is warm. Capillary refill takes less than 2 seconds. She is not diaphoretic. No erythema.   Psychiatric: She has a normal mood and affect. Her behavior is normal. Judgment and thought content normal.   Nursing note and vitals reviewed.      Significant Labs:   Blood Culture:   Recent Labs   Lab  09/23/18   0010  09/23/18   0030   LABBLOO  No Growth to date  No Growth to date  No Growth to date  No Growth to date  No Growth to date  No Growth to date     BMP:   Recent Labs   Lab  09/25/18   0947   GLU  145*   NA  140   K  3.6   CL  112*   CO2  17*   BUN  8   CREATININE  0.8   CALCIUM  8.8     Urine Culture:   Recent Labs   Lab  09/22/18   1354  09/23/18   0048   LABURIN  ENTEROCOCCUS FAECALIS  >100,000 cfu/ml  No other significant isolate    ENTEROCOCCUS FAECALIS  10,000 - 49,999 cfu/ml  No other significant isolate       Urine Studies:   Recent Labs   Lab  09/23/18   0048   COLORU  Yellow   APPEARANCEUA  Clear   PHUR  6.0   SPECGRAV  >=1.030*   PROTEINUA  2+*   GLUCUA  Negative   KETONESU  Trace*   BILIRUBINUA  1+*   OCCULTUA  2+*   NITRITE  Negative   UROBILINOGEN  Negative   LEUKOCYTESUR  1+*   RBCUA  15*   WBCUA  55*   BACTERIA  Few*   SQUAMEPITHEL  10   HYALINECASTS  0     All pertinent labs within the past 24 hours have been reviewed.    Significant Imaging: I have reviewed all pertinent imaging results/findings within the past 24 hours.

## 2018-09-26 NOTE — SUBJECTIVE & OBJECTIVE
Interval History:  18 year old woman with enterococcal UTI.  Isolate is not VRE and sensitive to Ampicillin.    Review of Systems   Constitutional: Positive for chills and fever.   HENT: Negative for congestion, nosebleeds, sinus pressure and trouble swallowing.    Eyes: Negative for photophobia.   Respiratory: Negative for cough, chest tightness, shortness of breath and wheezing.    Cardiovascular: Negative for chest pain, palpitations and leg swelling.   Gastrointestinal: Positive for abdominal pain (mild suprapubic). Negative for constipation and diarrhea.   Endocrine: Negative for polyphagia and polyuria.   Genitourinary: Positive for flank pain. Negative for dysuria and hematuria.   Musculoskeletal: Positive for back pain and myalgias. Negative for neck pain.   Skin: Negative for color change and wound.   Allergic/Immunologic: Negative for immunocompromised state.   Neurological: Negative for dizziness, seizures, weakness and headaches.   Hematological: Negative for adenopathy. Does not bruise/bleed easily.   Psychiatric/Behavioral: Negative for confusion, hallucinations and sleep disturbance. The patient is not nervous/anxious.    All other systems reviewed and are negative.    Objective:     Vital Signs (Most Recent):  Temp: 96.8 °F (36 °C) (09/26/18 0342)  Pulse: (!) 59 (09/26/18 0440)  Resp: 18 (09/26/18 0342)  BP: 135/78 (09/26/18 0342)  SpO2: (!) 93 % (09/26/18 0342) Vital Signs (24h Range):  Temp:  [96.8 °F (36 °C)-102 °F (38.9 °C)] 96.8 °F (36 °C)  Pulse:  [] 59  Resp:  [16-18] 18  SpO2:  [91 %-98 %] 93 %  BP: (110-154)/(67-94) 135/78     Weight: 87.5 kg (192 lb 14.4 oz)  Body mass index is 33.11 kg/m².    Estimated Creatinine Clearance: 120.1 mL/min (based on SCr of 0.8 mg/dL).    Physical Exam   Constitutional: She is oriented to person, place, and time. She appears well-developed and well-nourished. No distress.   Healthy appearing  female, in no distress, comfortably lying in bed,  pleasant.  Mother and sister at the bedside.   HENT:   Head: Normocephalic and atraumatic.   Eyes: Conjunctivae and EOM are normal. No scleral icterus.   Neck: Normal range of motion. Neck supple.   Cardiovascular: Regular rhythm and intact distal pulses. Tachycardia present.   No murmur heard.  Pulmonary/Chest: Effort normal and breath sounds normal. No respiratory distress. She has no wheezes.   Abdominal: Soft. She exhibits no distension. There is no tenderness. There is CVA tenderness (right). There is no guarding. No hernia.   Musculoskeletal: Normal range of motion. She exhibits no edema or tenderness.   Neurological: She is alert and oriented to person, place, and time. No cranial nerve deficit. She exhibits normal muscle tone. Coordination normal.   Skin: Skin is warm. Capillary refill takes less than 2 seconds. She is not diaphoretic. No erythema.   Psychiatric: She has a normal mood and affect. Her behavior is normal. Judgment and thought content normal.   Nursing note and vitals reviewed.      Significant Labs:   Blood Culture:   Recent Labs   Lab  09/23/18   0010  09/23/18   0030   LABBLOO  No Growth to date  No Growth to date  No Growth to date  No Growth to date  No Growth to date  No Growth to date     BMP:   Recent Labs   Lab  09/25/18   0947   GLU  145*   NA  140   K  3.6   CL  112*   CO2  17*   BUN  8   CREATININE  0.8   CALCIUM  8.8     Urine Culture:   Recent Labs   Lab  09/22/18   1354  09/23/18   0048   LABURIN  ENTEROCOCCUS FAECALIS  >100,000 cfu/ml  No other significant isolate    ENTEROCOCCUS FAECALIS  10,000 - 49,999 cfu/ml  No other significant isolate       Urine Studies:   Recent Labs   Lab  09/23/18   0048   COLORU  Yellow   APPEARANCEUA  Clear   PHUR  6.0   SPECGRAV  >=1.030*   PROTEINUA  2+*   GLUCUA  Negative   KETONESU  Trace*   BILIRUBINUA  1+*   OCCULTUA  2+*   NITRITE  Negative   UROBILINOGEN  Negative   LEUKOCYTESUR  1+*   RBCUA  15*   WBCUA  55*   BACTERIA  Few*    [facility]  10   HYALINECASTS  0     All pertinent labs within the past 24 hours have been reviewed.    Significant Imaging: I have reviewed all pertinent imaging results/findings within the past 24 hours.

## 2018-09-26 NOTE — ASSESSMENT & PLAN NOTE
Urine culture -enterococcus -will use Zyvox for 2 days and will adjust therapy if no VRE      09/25- will continue IV Ampicillin for now, stop zyvox.  Fever might still be present even on adequate therapy .

## 2018-09-27 VITALS
SYSTOLIC BLOOD PRESSURE: 129 MMHG | DIASTOLIC BLOOD PRESSURE: 73 MMHG | HEART RATE: 82 BPM | BODY MASS INDEX: 32.93 KG/M2 | TEMPERATURE: 98 F | WEIGHT: 192.88 LBS | HEIGHT: 64 IN | RESPIRATION RATE: 16 BRPM | OXYGEN SATURATION: 95 %

## 2018-09-27 PROBLEM — N12 PYELONEPHRITIS: Status: RESOLVED | Noted: 2018-09-24 | Resolved: 2018-09-27

## 2018-09-27 LAB
ANION GAP SERPL CALC-SCNC: 14 MMOL/L
BASOPHILS # BLD AUTO: 0.03 K/UL
BASOPHILS NFR BLD: 0.3 %
BUN SERPL-MCNC: 8 MG/DL
CALCIUM SERPL-MCNC: 8.6 MG/DL
CHLORIDE SERPL-SCNC: 109 MMOL/L
CO2 SERPL-SCNC: 17 MMOL/L
CREAT SERPL-MCNC: 0.8 MG/DL
DIFFERENTIAL METHOD: ABNORMAL
EOSINOPHIL # BLD AUTO: 0.1 K/UL
EOSINOPHIL NFR BLD: 1.3 %
ERYTHROCYTE [DISTWIDTH] IN BLOOD BY AUTOMATED COUNT: 14.3 %
EST. GFR  (AFRICAN AMERICAN): >60 ML/MIN/1.73 M^2
EST. GFR  (NON AFRICAN AMERICAN): >60 ML/MIN/1.73 M^2
GLUCOSE SERPL-MCNC: 99 MG/DL
HCT VFR BLD AUTO: 31.8 %
HGB BLD-MCNC: 10.5 G/DL
LYMPHOCYTES # BLD AUTO: 2.4 K/UL
LYMPHOCYTES NFR BLD: 27.1 %
MCH RBC QN AUTO: 28.7 PG
MCHC RBC AUTO-ENTMCNC: 33 G/DL
MCV RBC AUTO: 87 FL
MONOCYTES # BLD AUTO: 0.7 K/UL
MONOCYTES NFR BLD: 7.4 %
NEUTROPHILS # BLD AUTO: 5.7 K/UL
NEUTROPHILS NFR BLD: 63.9 %
PHOSPHATE SERPL-MCNC: 4.7 MG/DL
PLATELET # BLD AUTO: 311 K/UL
PMV BLD AUTO: 9.8 FL
POTASSIUM SERPL-SCNC: 3.5 MMOL/L
RBC # BLD AUTO: 3.66 M/UL
SODIUM SERPL-SCNC: 140 MMOL/L
WBC # BLD AUTO: 8.94 K/UL

## 2018-09-27 PROCEDURE — 80048 BASIC METABOLIC PNL TOTAL CA: CPT

## 2018-09-27 PROCEDURE — 36415 COLL VENOUS BLD VENIPUNCTURE: CPT

## 2018-09-27 PROCEDURE — 85025 COMPLETE CBC W/AUTO DIFF WBC: CPT

## 2018-09-27 PROCEDURE — 25000003 PHARM REV CODE 250: Performed by: INTERNAL MEDICINE

## 2018-09-27 PROCEDURE — 25000242 PHARM REV CODE 250 ALT 637 W/ HCPCS: Performed by: INTERNAL MEDICINE

## 2018-09-27 PROCEDURE — 94640 AIRWAY INHALATION TREATMENT: CPT

## 2018-09-27 PROCEDURE — 63600175 PHARM REV CODE 636 W HCPCS: Performed by: INTERNAL MEDICINE

## 2018-09-27 PROCEDURE — 94760 N-INVAS EAR/PLS OXIMETRY 1: CPT

## 2018-09-27 PROCEDURE — 84100 ASSAY OF PHOSPHORUS: CPT

## 2018-09-27 RX ORDER — AMOXICILLIN AND CLAVULANATE POTASSIUM 875; 125 MG/1; MG/1
1 TABLET, FILM COATED ORAL EVERY 12 HOURS
Qty: 20 TABLET | Refills: 0 | Status: SHIPPED | OUTPATIENT
Start: 2018-09-27 | End: 2018-09-27 | Stop reason: SDUPTHER

## 2018-09-27 RX ORDER — AMOXICILLIN AND CLAVULANATE POTASSIUM 875; 125 MG/1; MG/1
1 TABLET, FILM COATED ORAL EVERY 12 HOURS
Qty: 20 TABLET | Refills: 0 | Status: SHIPPED | OUTPATIENT
Start: 2018-09-27 | End: 2018-10-07

## 2018-09-27 RX ADMIN — POTASSIUM & SODIUM PHOSPHATES POWDER PACK 280-160-250 MG 1 PACKET: 280-160-250 PACK at 06:09

## 2018-09-27 RX ADMIN — IPRATROPIUM BROMIDE AND ALBUTEROL SULFATE 3 ML: .5; 3 SOLUTION RESPIRATORY (INHALATION) at 08:09

## 2018-09-27 RX ADMIN — AMPICILLIN SODIUM 2 G: 2 INJECTION, POWDER, FOR SOLUTION INTRAMUSCULAR; INTRAVENOUS at 03:09

## 2018-09-27 RX ADMIN — IBUPROFEN 400 MG: 400 TABLET ORAL at 10:09

## 2018-09-27 RX ADMIN — AMPICILLIN SODIUM 2 G: 2 INJECTION, POWDER, FOR SOLUTION INTRAMUSCULAR; INTRAVENOUS at 10:09

## 2018-09-27 RX ADMIN — ACETAMINOPHEN 650 MG: 325 TABLET, FILM COATED ORAL at 03:09

## 2018-09-27 RX ADMIN — FAMOTIDINE 20 MG: 20 TABLET ORAL at 10:09

## 2018-09-27 NOTE — PROGRESS NOTES
[facility]  Infectious Disease  Progress Note    Patient Name: Jeremiah Medina  MRN: [id number]  Admission Date: 9/22/2018  Length of Stay: 4 days  Attending Physician: Shahid Fuller, *  Primary Care Provider: Matthew Loo Ii, MD    Isolation Status: No active isolations  Assessment/Plan:      * Severe sepsis    From enterococcal UTI, will continue Zyvox for now and will deescalate therapy with final drug susceptibility      09/25- isolate is ampicillin sensitive-will use ampicillin and will plan to use PO Augumentin on discharge.    09/26- will continue Ampicillin.        Acute pyelonephritis    Urine culture -enterococcus -will use Zyvox for 2 days and will adjust therapy if no VRE      09/25- will continue IV Ampicillin for now, stop zyvox.  Fever might still be present even on adequate therapy .  09/26- patients with pyelonephritis will still continue to have fevers even while on therapy for some days .  Continue Ampicillin  Will plan to switch to PO in AM if fever curve improves.            Anticipated Disposition:     Thank you for your consult. I will follow-up with patient. Please contact us if you have any additional questions.    Chris Carlin MD  Infectious Disease  [facility]    Subjective:     Principal Problem:Severe sepsis    HPI: 18-year-old woman who was admitted with fever and right flank pain .She has history of nephrolithiasis with recent right ureteral stent placed on 9/14/18, the stent was taken out on 9/19/18 by Dr. Childs at [facility].    Since admission, she was noted to have T max of 103, wbc -25k, CT scan of the pelvis -right perinephric stranding consistent with pyelonephritis.  She was given bactrim prescription and one dose of Rocephin prior to presentation .  Urine culture- enterococcus. She was seen by mother and sister at bedside.      Interval History:  18 year old woman with enterococcal UTI.  Isolate is not VRE and  sensitive to Ampicillin.    09/26- she remains febrile , T amx 102.  Review of Systems   Constitutional: Positive for chills and fever.   HENT: Negative for congestion, nosebleeds, sinus pressure and trouble swallowing.    Eyes: Negative for photophobia.   Respiratory: Negative for cough, chest tightness, shortness of breath and wheezing.    Cardiovascular: Negative for chest pain, palpitations and leg swelling.   Gastrointestinal: Positive for abdominal pain (mild suprapubic). Negative for constipation and diarrhea.   Endocrine: Negative for polyphagia and polyuria.   Genitourinary: Positive for flank pain. Negative for dysuria and hematuria.   Musculoskeletal: Positive for back pain and myalgias. Negative for neck pain.   Skin: Negative for color change and wound.   Allergic/Immunologic: Negative for immunocompromised state.   Neurological: Negative for dizziness, seizures, weakness and headaches.   Hematological: Negative for adenopathy. Does not bruise/bleed easily.   Psychiatric/Behavioral: Negative for confusion, hallucinations and sleep disturbance. The patient is not nervous/anxious.    All other systems reviewed and are negative.    Objective:     Vital Signs (Most Recent):  Temp: 98.5 °F (36.9 °C) (09/27/18 0717)  Pulse: (!) 58 (09/27/18 0717)  Resp: 18 (09/27/18 0717)  BP: 139/84 (09/27/18 0717)  SpO2: (!) 93 % (09/27/18 0717) Vital Signs (24h Range):  Temp:  [97.4 °F (36.3 °C)-99.5 °F (37.5 °C)] 98.5 °F (36.9 °C)  Pulse:  [] 58  Resp:  [16-20] 18  SpO2:  [90 %-98 %] 93 %  BP: (125-153)/(73-92) 139/84     Weight: 87.5 kg (192 lb 14.4 oz)  Body mass index is 33.11 kg/m².    Estimated Creatinine Clearance: 120.1 mL/min (based on SCr of 0.8 mg/dL).    Physical Exam   Constitutional: She is oriented to person, place, and time. She appears well-developed and well-nourished. No distress.   Healthy appearing  female, in no distress, comfortably lying in bed, pleasant.  Mother and sister at the  bedside.   HENT:   Head: Normocephalic and atraumatic.   Eyes: Conjunctivae and EOM are normal. No scleral icterus.   Neck: Normal range of motion. Neck supple.   Cardiovascular: Regular rhythm and intact distal pulses. Tachycardia present.   No murmur heard.  Pulmonary/Chest: Effort normal and breath sounds normal. No respiratory distress. She has no wheezes.   Abdominal: Soft. She exhibits no distension. There is no tenderness. There is CVA tenderness (right). There is no guarding. No hernia.   Musculoskeletal: Normal range of motion. She exhibits no edema or tenderness.   Neurological: She is alert and oriented to person, place, and time. No cranial nerve deficit. She exhibits normal muscle tone. Coordination normal.   Skin: Skin is warm. Capillary refill takes less than 2 seconds. She is not diaphoretic. No erythema.   Psychiatric: She has a normal mood and affect. Her behavior is normal. Judgment and thought content normal.   Nursing note and vitals reviewed.      Significant Labs:   Blood Culture:   Recent Labs   Lab  09/23/18   0010  09/23/18   0030   LABBLOO  No Growth to date  No Growth to date  No Growth to date  No Growth to date  No Growth to date  No Growth to date  No Growth to date  No Growth to date     BMP:   Recent Labs   Lab  09/27/18   0430   GLU  99   NA  140   K  3.5   CL  109   CO2  17*   BUN  8   CREATININE  0.8   CALCIUM  8.6*     Urine Culture:   Recent Labs   Lab  09/22/18   1354  09/23/18   0048   LABURIN  ENTEROCOCCUS FAECALIS  >100,000 cfu/ml  No other significant isolate    ENTEROCOCCUS FAECALIS  10,000 - 49,999 cfu/ml  No other significant isolate       Urine Studies:   Recent Labs   Lab  09/23/18   0048   COLORU  Yellow   APPEARANCEUA  Clear   PHUR  6.0   SPECGRAV  >=1.030*   PROTEINUA  2+*   GLUCUA  Negative   KETONESU  Trace*   BILIRUBINUA  1+*   OCCULTUA  2+*   NITRITE  Negative   UROBILINOGEN  Negative   LEUKOCYTESUR  1+*   RBCUA  15*   WBCUA  55*   BACTERIA  Few*    [facility]  10   HYALINECASTS  0     All pertinent labs within the past 24 hours have been reviewed.    Significant Imaging: I have reviewed all pertinent imaging results/findings within the past 24 hours.

## 2018-09-27 NOTE — DISCHARGE SUMMARY
[facility] Hospital Medicine  Discharge Summary      Patient Name: Jeremiah Medina  MRN: [id number]  Admission Date: 9/22/2018  Hospital Length of Stay: 4 days  Discharge Date and Time:  09/27/2018 9:42 AM  Attending Physician: Shahid Fuller, *   Discharging Provider: Shahid Fuller MD  Primary Care Provider: Matthew Loo Ii, MD      HPI:   Ms. Medina is a young 18-year-old  female student with no significant medical problems, otherwise healthy, presents to the [facility] ED today (9/23/18) complaining of fever, and right flank pain. Patient had history of nephrolithiasis, had a right ureteral stent placed on 9/14/18, the stent was taken out on 9/19/18 by Dr. Childs at [facility].  Yesterday patient presented to the [place] ED complaining of fever, she was given one dose of IV Rocephin and discharged from the ED with oral Bactrim.  Patient came to visit her mother in [place], symptoms got progressively worse, fever of 102.8, nausea, vomiting, right flank pain.    She is found to be in severe sepsis with temperature 102.8°, heart rate > 90, WBC 25,000, with 25% bands, lactic acid 2.2, procalcitonin 5.5.  Initial blood pressure 81/51, MAP 61.  Patient received 30 mL/kilogram normal saline bolus in the ED, with improvement of blood pressure to 99/58, MAP 75.  Blood and urine cultures were obtained.  Started on IV Zosyn and vancomycin empirically. Preliminary reading of CT abdomen pelvis without contrast  reveals no renal calculus. No hydronephrosis. Subtle right perinephric stranding consistent with pyelonephritis.         * No surgery found *      Hospital Course:   19 y/o wf admitted  With a dx of severe sepsis 2/2/ to pyelonephritis . Patient had history of nephrolithiasis, had a right ureteral stent placed on 9/14/18. The stent was taken out on 9/19/18 by Dr. Childs at [facility]. CT scan from 9/22/18 which show  pyelonephritis . Pt was started on IV vanc and zosyn .  She had 30 cc/kg IVF  Per sepsis protocol . The blood pressure is borderline low  With a MAP > 65 . The  Lactic acid  Was 2.2 (POA) and procalcitonin level elevated (POA). Pt tempeture  Went up to 104.3 F  And trend down after  Dose of tylenol, ibuprofen and  IV Toradol .  The urine cx  Is positive for enterococcus  Sensitive to PCN . Pt remain afebrile  For more than 24 hrs . Pt was seen and examined  at bedside . She was determined  To be suitable for d/c          Consults:   Consults (From admission, onward)        Status Ordering Provider     Inpatient consult to Infectious Diseases  Once     Provider:  Chris Carlin MD    Acknowledged SHASHI REILLY          * Severe sepsis    (+) urine  cx   Enterococcus  Cont IVF  And  IVAB  Keep MAP > 65   Resolved             Acute pyelonephritis    CT abdomen/pelvis reveals mom mild perinephric stranding on the right kidney.  Continue IV antibiotics  Blood cx NGTD  Urine cx (+) for enterococcus   Continue IV fluids and supportive care.  Consult ID             Final Active Diagnoses:    Diagnosis Date Noted POA    PRINCIPAL PROBLEM:  Severe sepsis [A41.9, R65.20] 09/23/2018 Yes    Acute pyelonephritis [N10] 09/23/2018 Yes      Problems Resolved During this Admission:    Diagnosis Date Noted Date Resolved POA    Pyelonephritis [N12] 09/24/2018 09/27/2018 Yes    Urolithiasis [N20.9] 09/14/2018 09/24/2018 Yes       Discharged Condition: stable    Disposition: Home or Self Care    Follow Up:  Follow-up Information     Matthew Loo Ii, MD In 1 week.    Specialty:  Pediatrics  Contact information:  [address] [facility] [address] [phone]                 Patient Instructions:      Diet Cardiac     Notify your health care provider if you experience any of the following:  temperature >100.4     Activity as tolerated       Significant Diagnostic Studies: Labs: All labs within the past 24 hours  have been reviewed    Pending Diagnostic Studies:     None         Medications:  Reconciled Home Medications:      Medication List      START taking these medications    amoxicillin-clavulanate 875-125mg 875-125 mg per tablet  Commonly known as:  AUGMENTIN  Take 1 tablet by mouth every 12 (twelve) hours. for 10 days        CONTINUE taking these medications    acetaminophen 500 MG tablet  Commonly known as:  TYLENOL  Take 500 mg by mouth every 6 (six) hours as needed for Pain.     acetaminophen-codeine 300-30mg 300-30 mg Tab  Commonly known as:  TYLENOL #3  Take 1 tablet by mouth every 6 (six) hours as needed. FOR SEVERE PAIN     ISOtretinoin 40 MG capsule  Commonly known as:  ACCUTANE  Take 40 mg by mouth 2 (two) times daily.     ketorolac 10 mg tablet  Commonly known as:  TORADOL  Take 1 tablet (10 mg total) by mouth every 6 (six) hours as needed for Pain.     ondansetron 8 MG tablet  Commonly known as:  ZOFRAN  Take 1 tablet (8 mg total) by mouth every 8 (eight) hours as needed for Nausea.     oxyCODONE-acetaminophen 5-325 mg per tablet  Commonly known as:  PERCOCET  Take 1 tablet by mouth every 4 (four) hours as needed for Pain.     tamsulosin 0.4 mg Cap  Commonly known as:  FLOMAX  Take 1 capsule (0.4 mg total) by mouth once daily.     VENTOLIN HFA 90 mcg/actuation inhaler  Generic drug:  albuterol  INHALE TWO TO FOUR PUFFS 30 minutes prior TO exercise        STOP taking these medications    sulfamethoxazole-trimethoprim 800-160mg 800-160 mg Tab  Commonly known as:  BACTRIM DS            Indwelling Lines/Drains at time of discharge:   Lines/Drains/Airways          None          Time spent on the discharge of patient: 35 minutes  Patient was seen and examined on the date of discharge and determined to be suitable for discharge.         Shahid Fuller MD  Department of Hospital Medicine  [facility]

## 2018-09-27 NOTE — ASSESSMENT & PLAN NOTE
From enterococcal UTI, will continue Zyvox for now and will deescalate therapy with final drug susceptibility      09/25- isolate is ampicillin sensitive-will use ampicillin and will plan to use PO Augumentin on discharge.    09/26- will continue Ampicillin.

## 2018-09-27 NOTE — PLAN OF CARE
Problem: Patient Care Overview  Goal: Plan of Care Review  Patient vitals remain stable throughout shift. Safety measures maintained. Education and plan of care provided and explained. All alarms are on and audible, will continue to monitor.

## 2018-09-27 NOTE — SUBJECTIVE & OBJECTIVE
Interval History:  18 year old woman with enterococcal UTI.  Isolate is not VRE and sensitive to Ampicillin.    09/26- she remains febrile , T amx 102.  Review of Systems   Constitutional: Positive for chills and fever.   HENT: Negative for congestion, nosebleeds, sinus pressure and trouble swallowing.    Eyes: Negative for photophobia.   Respiratory: Negative for cough, chest tightness, shortness of breath and wheezing.    Cardiovascular: Negative for chest pain, palpitations and leg swelling.   Gastrointestinal: Positive for abdominal pain (mild suprapubic). Negative for constipation and diarrhea.   Endocrine: Negative for polyphagia and polyuria.   Genitourinary: Positive for flank pain. Negative for dysuria and hematuria.   Musculoskeletal: Positive for back pain and myalgias. Negative for neck pain.   Skin: Negative for color change and wound.   Allergic/Immunologic: Negative for immunocompromised state.   Neurological: Negative for dizziness, seizures, weakness and headaches.   Hematological: Negative for adenopathy. Does not bruise/bleed easily.   Psychiatric/Behavioral: Negative for confusion, hallucinations and sleep disturbance. The patient is not nervous/anxious.    All other systems reviewed and are negative.    Objective:     Vital Signs (Most Recent):  Temp: 98.5 °F (36.9 °C) (09/27/18 0717)  Pulse: (!) 58 (09/27/18 0717)  Resp: 18 (09/27/18 0717)  BP: 139/84 (09/27/18 0717)  SpO2: (!) 93 % (09/27/18 0717) Vital Signs (24h Range):  Temp:  [97.4 °F (36.3 °C)-99.5 °F (37.5 °C)] 98.5 °F (36.9 °C)  Pulse:  [] 58  Resp:  [16-20] 18  SpO2:  [90 %-98 %] 93 %  BP: (125-153)/(73-92) 139/84     Weight: 87.5 kg (192 lb 14.4 oz)  Body mass index is 33.11 kg/m².    Estimated Creatinine Clearance: 120.1 mL/min (based on SCr of 0.8 mg/dL).    Physical Exam   Constitutional: She is oriented to person, place, and time. She appears well-developed and well-nourished. No distress.   Healthy appearing   female, in no distress, comfortably lying in bed, pleasant.  Mother and sister at the bedside.   HENT:   Head: Normocephalic and atraumatic.   Eyes: Conjunctivae and EOM are normal. No scleral icterus.   Neck: Normal range of motion. Neck supple.   Cardiovascular: Regular rhythm and intact distal pulses. Tachycardia present.   No murmur heard.  Pulmonary/Chest: Effort normal and breath sounds normal. No respiratory distress. She has no wheezes.   Abdominal: Soft. She exhibits no distension. There is no tenderness. There is CVA tenderness (right). There is no guarding. No hernia.   Musculoskeletal: Normal range of motion. She exhibits no edema or tenderness.   Neurological: She is alert and oriented to person, place, and time. No cranial nerve deficit. She exhibits normal muscle tone. Coordination normal.   Skin: Skin is warm. Capillary refill takes less than 2 seconds. She is not diaphoretic. No erythema.   Psychiatric: She has a normal mood and affect. Her behavior is normal. Judgment and thought content normal.   Nursing note and vitals reviewed.      Significant Labs:   Blood Culture:   Recent Labs   Lab  09/23/18   0010  09/23/18   0030   LABBLOO  No Growth to date  No Growth to date  No Growth to date  No Growth to date  No Growth to date  No Growth to date  No Growth to date  No Growth to date     BMP:   Recent Labs   Lab  09/27/18   0430   GLU  99   NA  140   K  3.5   CL  109   CO2  17*   BUN  8   CREATININE  0.8   CALCIUM  8.6*     Urine Culture:   Recent Labs   Lab  09/22/18   1354  09/23/18   0048   LABURIN  ENTEROCOCCUS FAECALIS  >100,000 cfu/ml  No other significant isolate    ENTEROCOCCUS FAECALIS  10,000 - 49,999 cfu/ml  No other significant isolate       Urine Studies:   Recent Labs   Lab  09/23/18   0048   COLORU  Yellow   APPEARANCEUA  Clear   PHUR  6.0   SPECGRAV  >=1.030*   PROTEINUA  2+*   GLUCUA  Negative   KETONESU  Trace*   BILIRUBINUA  1+*   OCCULTUA  2+*   NITRITE  Negative    UROBILINOGEN  Negative   LEUKOCYTESUR  1+*   RBCUA  15*   WBCUA  55*   BACTERIA  Few*   SQUAMEPITHEL  10   HYALINECASTS  0     All pertinent labs within the past 24 hours have been reviewed.    Significant Imaging: I have reviewed all pertinent imaging results/findings within the past 24 hours.

## 2018-09-27 NOTE — PLAN OF CARE
Problem: Patient Care Overview  Goal: Plan of Care Review  Outcome: Ongoing (interventions implemented as appropriate)  Pt on room air; tolerates txs well; no resp distress noted at this time.

## 2018-09-27 NOTE — ASSESSMENT & PLAN NOTE
Urine culture -enterococcus -will use Zyvox for 2 days and will adjust therapy if no VRE      09/25- will continue IV Ampicillin for now, stop zyvox.  Fever might still be present even on adequate therapy .  09/26- patients with pyelonephritis will still continue to have fevers even while on therapy for some days .  Continue Ampicillin  Will plan to switch to PO in AM if fever curve improves.

## 2018-09-27 NOTE — PLAN OF CARE
Problem: Patient Care Overview  Goal: Plan of Care Review  Outcome: Ongoing (interventions implemented as appropriate)  Plan of care discussed with patient, and patient verbalized understanding.  Patient remained AOx4, room air, and SB to NSR on the monitor.  Patient is a possible d/c 9/27/18.  Patient is receiving ampicillin.   Patient remained free of falls, accidents, and trama during the day shift.  Bed is in the lowest position and call light is within reach - Continue to monitor.

## 2018-09-27 NOTE — NURSING
Discharge instructions given to pt and mother. IV removed, tele monitor removed. Paper prescription for antibiotic given to pt. Walked with patient to front lobby for mother to bring home.

## 2018-09-28 LAB
BACTERIA BLD CULT: NORMAL
BACTERIA BLD CULT: NORMAL

## 2018-09-28 NOTE — PLAN OF CARE
09/28/18 0804   Final Note   Assessment Type Final Discharge Note   Discharge Disposition Home   Right Care Referral Info   Post Acute Recommendation No Care

## 2019-08-27 ENCOUNTER — OFFICE VISIT (OUTPATIENT)
Dept: SPORTS MEDICINE | Facility: CLINIC | Age: 19
End: 2019-08-27
Payer: COMMERCIAL

## 2019-08-27 VITALS
WEIGHT: 192 LBS | HEART RATE: 74 BPM | SYSTOLIC BLOOD PRESSURE: 113 MMHG | HEIGHT: 64 IN | BODY MASS INDEX: 32.78 KG/M2 | DIASTOLIC BLOOD PRESSURE: 68 MMHG

## 2019-08-27 DIAGNOSIS — M79.652 MUSCULOSKELETAL THIGH PAIN, LEFT: Primary | ICD-10-CM

## 2019-08-27 DIAGNOSIS — S76.312A LEFT HAMSTRING STRAIN, INITIAL ENCOUNTER: ICD-10-CM

## 2019-08-27 PROCEDURE — 76882 US LMTD JT/FCL EVL NVASC XTR: CPT | Mod: S$GLB,,, | Performed by: ORTHOPAEDIC SURGERY

## 2019-08-27 PROCEDURE — 3008F BODY MASS INDEX DOCD: CPT | Mod: CPTII,S$GLB,, | Performed by: ORTHOPAEDIC SURGERY

## 2019-08-27 PROCEDURE — 3008F PR BODY MASS INDEX (BMI) DOCUMENTED: ICD-10-PCS | Mod: CPTII,S$GLB,, | Performed by: ORTHOPAEDIC SURGERY

## 2019-08-27 PROCEDURE — 76882 PR  US,EXTREMITY,NONVASCULAR,REAL-TIME IMAGE,LIMITED: ICD-10-PCS | Mod: S$GLB,,, | Performed by: ORTHOPAEDIC SURGERY

## 2019-08-27 PROCEDURE — 99999 PR PBB SHADOW E&M-EST. PATIENT-LVL III: ICD-10-PCS | Mod: PBBFAC,,, | Performed by: ORTHOPAEDIC SURGERY

## 2019-08-27 PROCEDURE — 99999 PR PBB SHADOW E&M-EST. PATIENT-LVL III: CPT | Mod: PBBFAC,,, | Performed by: ORTHOPAEDIC SURGERY

## 2019-08-27 PROCEDURE — 99204 OFFICE O/P NEW MOD 45 MIN: CPT | Mod: 25,S$GLB,, | Performed by: ORTHOPAEDIC SURGERY

## 2019-08-27 PROCEDURE — 99204 PR OFFICE/OUTPT VISIT, NEW, LEVL IV, 45-59 MIN: ICD-10-PCS | Mod: 25,S$GLB,, | Performed by: ORTHOPAEDIC SURGERY

## 2019-08-27 RX ORDER — MELOXICAM 15 MG/1
15 TABLET ORAL DAILY
Qty: 30 TABLET | Refills: 0 | Status: SHIPPED | OUTPATIENT
Start: 2019-08-27 | End: 2022-01-21

## 2019-08-27 NOTE — Clinical Note
August 27, 2019      [facility]            [facility]  [address]  [facility] [address]  Phone: [phone]          Patient: Jeremiah Medina   MR Number: [id number]   YOB: 2000   Date of Visit: 8/27/2019       Dear [facility] :    Thank you for referring Jeremiah Medina to me for evaluation. Attached you will find relevant portions of my assessment and plan of care.    If you have questions, please do not hesitate to call me. I look forward to following Jeremiah Medina along with you.    Sincerely,    Randy Wilson, DO    Enclosure  CC:  No Recipients    If you would like to receive this communication electronically, please contact [email or web address] or [phone] to request more information on [employer] access.    For providers and/or their staff who would like to refer a patient to [facility], please contact us through our one-stop-shop provider referral line, Emerald Garcia, at [phone].    If you feel you have received this communication in error or would no longer like to receive these types of communications, please e-mail [email or web address]

## 2019-08-27 NOTE — LETTER
August 27, 2019      [facility]  [address]  [facility] [address]  Phone: [phone]       Patient: Jeremiah Medina   YOB: 2000  Date of Visit: 08/27/2019    To Whom It May Concern:    Gael Medina  was at [facility] on 08/27/2019. If you have any questions or concerns, or if I can be of further assistance, please do not hesitate to contact me.    Sincerely,      Dr. Randy Wilson, DO

## 2019-08-27 NOTE — PROGRESS NOTES
"CC: left hamstring pain    19 y.o. Female presents today for evaluation of her left hamstring pain. 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 Patient reports she injured her "high hamstring" during her game 8/23/2019. Patient reports she dove for a ball and left leg went straight out to the side and she reports feeling a "pop" just below her gluteal fold and reports it was more medial. Patient reports she was able to finish playing the game and that night she reports pain with straightening out her leg. Patient reports she did not play the remaining three games of the weekend. Patient reports she has been feeling better over the last few days. Patient reports she participated in a light practice 8/26/2019. She reports she had pain with stepping to her left and bending down low. Patient denies prior history left hamstring injury. She reports history of SI joint dysfunction.   How long: Patient reports onset of pain 8/23/2019.  What makes it better: Patient reports her pain feels better when she stays off of her leg.   What makes it worse: Patient reports her pain is increased with walking up an incline, stairs, bending low, and stepping to the left.   Does it radiate: Patient denies radiating pain.   Attempted treatments: Patient reports she had stim and heat prior to practice 8/26/2019. Patient denies use of a brace or wrap.   Pain score: 4/10  Any mechanical symptoms: Patient denies continued mechanical symptoms following the initial "pop."  Feelings of instability: Patient denies feelings of instability.  Affecting ADLs: Patient reports she is able to get around school but is having difficulty with stairs.    REVIEW OF SYSTEMS:   Constitution: Patient denies fever, chills, night sweats, and weight changes.  Eyes: Patient denies eye pain or vision changes.  HENT: Patient denies headache, ear pain, sore throat, or nasal discharge.  CVS: Patient denies chest " pain.  Lungs: Patient denies shortness of breath or cough.  Abd: Patient denies stomach pain, nausea, or vomiting.  Skin: Patient denies skin rash or itching.    Hematologic/Lymphatic: Patient denies easy bruising.   Musculoskeletal: Patient denies recent falls. See HPI.  Psych: Patient denies any current anxiety or nervousness.    PAST MEDICAL HISTORY:   Past Medical History:   Diagnosis Date    Asthma     exercise induced    Kidney stone        PAST SURGICAL HISTORY:   Past Surgical History:   Procedure Laterality Date    CYSTOSCOPY, WITH RETROGRADE PYELOGRAM Right 9/14/2018    Performed [facility]    CYSTOSCOPY, WITH URETERAL STENT INSERTION Right 9/14/2018    Performed [facility] at [facility]    REMOVAL, CALCULUS, URETER, URETEROSCOPIC Right 9/14/2018    Performed [facility] at [facility]ST [facility]    WISDOM TOOTH EXTRACTION         FAMILY HISTORY:   Family History   Problem Relation Age of Onset    Diabetes Maternal Grandmother        SOCIAL HISTORY:   Social History     Socioeconomic History    Marital status: Single     Spouse name: Not on file    Number of children: Not on file    Years of education: Not on file    Highest education level: Not on file   Occupational History    Not on file   Social Needs    Financial resource strain: Not on file    Food insecurity:     Worry: Not on file     Inability: Not on file    Transportation needs:     Medical: Not on file     Non-medical: Not on file   Tobacco Use    Smoking status: Never Smoker    Smokeless tobacco: Never Used   Substance and Sexual Activity    Alcohol use: No     Frequency: Never    Drug use: No    Sexual activity: Never   Lifestyle    Physical activity:     Days per week: Not on file     Minutes per session: Not on file    Stress: Not on file   Relationships    Social connections:     Talks on phone: Not on file     Gets together: Not on file     Attends [employer]  "service: Not on file     Active member of club or organization: Not on file     Attends meetings of clubs or organizations: Not on file     Relationship status: Not on file   Other Topics Concern    Not on file   Social History Narrative    Not on file       MEDICATIONS:     Current Outpatient Medications:     acetaminophen (TYLENOL) 500 MG tablet, Take 500 mg by mouth every 6 (six) hours as needed for Pain., Disp: , Rfl:     acetaminophen-codeine 300-30mg (TYLENOL #3) 300-30 mg Tab, Take 1 tablet by mouth every 6 (six) hours as needed. FOR SEVERE PAIN, Disp: , Rfl: 0    ISOtretinoin (ACCUTANE) 40 MG capsule, Take 40 mg by mouth 2 (two) times daily., Disp: , Rfl:     ketorolac (TORADOL) 10 mg tablet, Take 1 tablet (10 mg total) by mouth every 6 (six) hours as needed for Pain., Disp: 12 tablet, Rfl: 0    ondansetron (ZOFRAN) 8 MG tablet, Take 1 tablet (8 mg total) by mouth every 8 (eight) hours as needed for Nausea., Disp: 20 tablet, Rfl: 0    oxyCODONE-acetaminophen (PERCOCET) 5-325 mg per tablet, Take 1 tablet by mouth every 4 (four) hours as needed for Pain., Disp: 15 tablet, Rfl: 0    tamsulosin (FLOMAX) 0.4 mg Cap, Take 1 capsule (0.4 mg total) by mouth once daily., Disp: 20 capsule, Rfl: 0    VENTOLIN HFA 90 mcg/actuation inhaler, INHALE TWO TO FOUR PUFFS 30 minutes prior TO exercise, Disp: , Rfl: 2    meloxicam (MOBIC) 15 MG tablet, Take 1 tablet (15 mg total) by mouth once daily., Disp: 30 tablet, Rfl: 0    ALLERGIES:   Review of patient's allergies indicates:  No Known Allergies     PHYSICAL EXAMINATION:  /68   Pulse 74   Ht 5' 4" (1.626 m)   Wt 87.1 kg (192 lb)   BMI 32.96 kg/m²   Vitals signs and nursing note have been reviewed.  General: In no acute distress, well developed, well nourished, no diaphoresis  Eyes: EOM full and smooth, no eye redness or discharge  HENT: normocephalic and atraumatic, neck supple, trachea midline, no nasal discharge, no external ear redness or " discharge  Cardiovascular: no LE edema  Lungs: respirations non-labored, no conversational dyspnea   Abd: non-distended, no rigidity  MSK: no amputation or deformity, no swelling of extremities  Neuro: AAOx3, CN2-12 grossly intact  Skin: No rashes, warm and dry  Psychiatric: cooperative, pleasant, mood and affect appropriate for age    HIP/UPPER LEG: LEFT  The affected hip is compared to the contralateral hip.    Observation:    There is no edema, erythema, or ecchymosis in the lumbosacral region.   There is no Trendelenburg sign on either side  No obvious pelvic obliquity while standing.    No thoracolumbar scoliosis observed.    No midline skin abnormalities.  No atrophy noted in the lower limbs.    ROM:   Passive hip flexion to 120° on left and 120° on right.    Passive hip internal rotation to 45° on left and 45° on right.    Passive hip external rotation to 45° on left and 45° on right.    Passive hip abduction to 45° on left and 45° on right.    All motions above are without pain except for mild discomfort with external rotation.    Tenderness To Palpation:  No tenderness at the ASIS, AIIS, PSIS, PIIS, iliac crest, pubic bones, ischial tuberosity.  No tenderness throughout the lumbar spine, iliolumbar region, or posterior pelvis.  No tenderness throughout the sacrum or piriformis  No tenderness over the greater trochanteric bursa or greater/lesser trochanters.  No tenderness at the glut attachments on the greater trochanter  No tenderness over proximal IT band or hip flexor musculature.  + tenderness at the proximal hamstring tendon on the ischium as well as tenderness approximately 4 inches distal to the attachment    Strength Testing:  Hip flexion - 5/5 on left and 5/5 on right  Hip extension - 5/5 on left and 5/5 on right  Hip adduction - 5/5 on left and 5/5 on right  Hip abduction - 5/5 on left and 5/5 on right  Knee flexion - 5/5 on left and 5/5 on right  Knee extension - 5/5 on left and 5/5 on  right  Mild discomfort at hamstring attachment with hamstring muscle testing    Special Tests:  Standing Trendelenburg test - negative    Seated straight leg raise - negative  Supine straight leg raise - negative  Hamstring flexibility symmetric, mild discomfort on the left with testing    Log roll - negative  KHLOE - negative  FADIR - negative  Scour test - negative  No pain with posterior hip capsule compression    ASIS compression test - negative  SI drawer test - negative   Thigh thrust test - negative     Quadriceps flexibility symmetric.    Fulcrum test - negative    Neurovascular Exam:  Normal gait without Trendelenburg.  Hamstring/gluteal firing pattern abnormal on left.  2+ femoral, DP, and PT pulses BL.  No skin changes, no abnormal hair distribution.  Sensation intact to light touch throughout the obturator and medial/lateral/posterior femoral cutaneous nerves.  Capillary refill intact to <2 seconds in all lower limb digits.    IMAGING:  DIAGNOSTIC ULTRASOUND FOCUSED:  Performed on 08/27/2019  1. Diagnostic Extremity - MSK-Sports Ultrasound was recommended due to left hamstring injury.  2. Diagnostic Extremity - MSK-Sports Ultrasound Performed: Randy Wilson Extremity Study: left posterior thigh.  TECHNIQUE: Real time ultrasound examination of the left posterior thigh was performed with [employer] Incte Edge 2, 9-L MHz linear probe(s).   FINDINGS: The images are of adequate diagnostic quality with identification of all echogenic structures made except for the vascular structures unless otherwise noted. There is no sonographic evidence of soft tissue edema or nerve irregularities.    The proximal hamstring was visualized and long and short axis. At the hamstring tendon attachment at the ischium, there is a hypoechoic area adjacent to the attachment, consistent with grade 1 hamstring strain.  There are some cortical irregularities at this area of attachment, consistent with tendinopathy.  In the proximal 1/3 of  the rectus femorals, there is a small amount of edema along the medial aspect of the muscle, consistent with strain.  There are no tears of the hamstring musculature.   The rest of the sonographic examination was unremarkable.  Ultrasound images were directly reviewed with the patient and then a treatment plan was discussed.  IMPRESSION:  Grade 1 proximal hamstring strain.      ASSESSMENT:      ICD-10-CM ICD-9-CM   1. Musculoskeletal thigh pain, left M79.652 729.5   2. Left hamstring strain, initial encounter S76.312A 843.8         PLAN:  1-2.  Left thigh pain/left hamstring strain -     - Jeremiah is a senior  for [employer] and she suffered a hamstring injury on 08/23/2019 while lunging for a ball. She admits to feeling a pop up in her buttock area and since has been having difficulty playing volleyball.  She is able to perform some tasks, most of which with pain.    - Based on her description the injury and exam, I recommended diagnostic ultrasound of the hamstring to assess for tearing and/or avulsion injury.  See above and report for procedure detail.    - As findings are consistent with a grade one hamstring strain, I recommend rehabilitation exercises daily in addition to consistent anti-inflammatories and icing.  She is agreeable to this plan. I also encouraged her to avoid volleyball activity for the remainder of the week to rest the injured area. We will see how she is feeling next week and will determine if she can progress back to volleyball activity.    - Prescription for Mobic 15 mg to be taken daily for 2 weeks then as needed sent to the pharmacy.  Encouraged icing for 15-20 minute once to twice daily.    - information was related directly to her  who will pass along the information and plan to her .      Future planning includes - if not improving with the above, consider MRI    All questions were answered to the best of my ability and all concerns were  addressed at this time.    Follow up in 2-3 weeks for above, or sooner if needed.      This note is dictated using the M*Modal Fluency Direct word recognition program. There are word recognition mistakes that are occasionally missed on review.

## 2019-08-28 NOTE — PROGRESS NOTES
DIAGNOSTIC ULTRASOUND FOCUSED:  1. Diagnostic Extremity - MSK-Sports Ultrasound was recommended due to left hamstring injury.  2. Diagnostic Extremity - MSK-Sports Ultrasound Performed: Randy Wilson Extremity Study: left posterior thigh.    TECHNIQUE: Real time ultrasound examination of the left posterior thigh was performed with [employer] Edge 2, 9-L MHz linear probe(s).     FINDINGS: The images are of adequate diagnostic quality with identification of all echogenic structures made except for the vascular structures unless otherwise noted. There is no sonographic evidence of soft tissue edema or nerve irregularities.      The proximal hamstring was visualized and long and short axis. At the hamstring tendon attachment at the ischium, there is a hypoechoic area adjacent to the attachment, consistent with grade 1 hamstring strain.  There are some cortical irregularities at this area of attachment, consistent with tendinopathy.  In the proximal 1/3 of the rectus femorals, there is a small amount of edema along the medial aspect of the muscle, consistent with strain.  There are no tears of the hamstring musculature.     The rest of the sonographic examination was unremarkable.    Ultrasound images were directly reviewed with the patient and then a treatment plan was discussed.    IMPRESSION:  Grade 1 proximal hamstring strain.

## 2020-02-20 NOTE — ED PROVIDER NOTES
Problem: Oxygenation:  Goal: Maintain adequate oxygenation dependent on patient condition  Outcome: PROGRESSING AS EXPECTED     Problem: Hyperinflation:  Goal: Prevent or improve atelectasis  Outcome: PROGRESSING AS EXPECTED     PEP QID      2-3L NC    SCRIBE #1 NOTE: I, Shellie Sorenson, am scribing for, and in the presence of, Jeri Cortés MD. I have scribed the entire note.      History      Chief Complaint   Patient presents with    Flank Pain     stent placed for kidney stone 9/14, removed 9/19; dx kidney infection 9/22; increased flank pain, HA, n/v, fever; fever max of 102 at home; prescribed bactrim today       Review of patient's allergies indicates:  No Known Allergies     HPI   HPI    9/23/2018, 12:00 AM   History obtained from the patient and mother       History of Present Illness: Jeremiah Medina is a 18 y.o. female patient with a PMHx of asthma and kidney stones who presents to the Emergency Department for R flank pain which onset gradually 9/20. Pt is actively vomiting at this time. She also reports vomiting twice today. Symptoms are constant and moderate in severity. Pt reports sxs are exacerbated by head movement and deep breaths. No mitigating factors reported. Associated sxs include HA, n/v, fever (Tmax 104.8), chills, and neck pain. Patient denies any dysuria, hematuria, visual disturbance/photophobia, extremity weakness/numbness, dizziness, constipation, hematochezia, urinary frequency, and all other sxs at this time. Pt reports she had a stent placed for a kidney stone (5mm) on 9/14 and removed 9/19. Pt's sxs began to worsen. Pt visited ER in [place] today around 1:00PM. She was given IV fluids. Pt was also prescribed Bactrim and dx with pyelonephritis. Pt was told to go back to ER if she began to vomit. Pt reports taking ibuprofen and Toradol around 4:PM.  No further complaints or concerns at this time.       Arrival mode: Personal vehicle     PCP: Matthew Loo Ii, MD       Past Medical History:  Past Medical History:   Diagnosis Date    Asthma     exercise induced       Past Surgical History:  Past Surgical History:   Procedure Laterality Date    CYSTOSCOPY W/ RETROGRADES Right 9/14/2018    Procedure: CYSTOSCOPY, WITH RETROGRADE  PYELOGRAM;  Surgeon: Mian Childs MD;  Location: [facility] OR [address] [place] [address];  Service: Urology;  Laterality: Right;    CYSTOSCOPY W/ URETERAL STENT PLACEMENT Right 9/14/2018    Procedure: CYSTOSCOPY, WITH URETERAL STENT INSERTION;  Surgeon: Mian Childs MD;  Location: [facility] OR [address] [place] [address];  Service: Urology;  Laterality: Right;    CYSTOSCOPY, WITH RETROGRADE PYELOGRAM Right 9/14/2018    Performed by Mian Childs MD at [facility] OR [address] [place] [address]    CYSTOSCOPY, WITH URETERAL STENT INSERTION Right 9/14/2018    Performed by Mian Childs MD at [facility] OR [address] [place] [address]    REMOVAL, CALCULUS, URETER, URETEROSCOPIC Right 9/14/2018    Performed by Mian Childs MD at [facility] OR [address] [place] [address]    URETEROSCOPIC REMOVAL OF URETERIC CALCULUS Right 9/14/2018    Procedure: REMOVAL, CALCULUS, URETER, URETEROSCOPIC;  Surgeon: Mian Childs MD;  Location: [facility] OR [address] [place] [address];  Service: Urology;  Laterality: Right;  1.5 hours    WISDOM TOOTH EXTRACTION           Family History:  Family History   Problem Relation Age of Onset    Diabetes Maternal Grandmother        Social History:  Social History     Tobacco Use    Smoking status: Never Smoker    Smokeless tobacco: Never Used   Substance and Sexual Activity    Alcohol use: No     Frequency: Never    Drug use: No    Sexual activity: No       ROS   Review of Systems   Constitutional: Positive for chills and fever.   HENT: Negative for sore throat.    Eyes: Negative for photophobia and visual disturbance.   Respiratory: Negative for shortness of breath.    Cardiovascular: Negative for chest pain.   Gastrointestinal: Positive for abdominal pain (R flank pain), nausea and vomiting. Negative for blood in stool and constipation.   Genitourinary: Negative for dysuria, frequency and hematuria.   Musculoskeletal: Positive for neck pain. Negative for back pain.   Skin: Negative for rash.   Neurological: Positive for weakness and headaches. Negative for dizziness and numbness.   Hematological: Does not  "bruise/bleed easily.   All other systems reviewed and are negative.      Physical Exam      Initial Vitals [09/22/18 2257]   BP Pulse Resp Temp SpO2   110/65 (!) 122 20 98.9 °F (37.2 °C) 95 %      MAP       --          Physical Exam  Nursing Notes and Vital Signs Reviewed.  Constitutional: Patient is in mild distress. Well-developed and well-nourished. Actively vomiting.   Head: Atraumatic. Normocephalic.  Eyes: PERRL. EOM intact. Conjunctivae are not pale. No scleral icterus.  ENT: Mucous membranes are moist. Oropharynx is clear and symmetric.    Neck: Supple. Full ROM. No lymphadenopathy.  Cardiovascular: Regular rate. Regular rhythm. No murmurs, rubs, or gallops. Distal pulses are 2+ and symmetric.  Pulmonary/Chest: No respiratory distress. Clear to auscultation bilaterally. No wheezing or rales.  Abdominal:  Soft and non-distended.  There is R flank tenderness and RLQ tenderness.  No rebound, guarding, or rigidity.  No organomegaly. No pulsatile mass. Normal bowel sounds.  Genitourinary: No CVA tenderness  Musculoskeletal: Moves all extremities. No obvious deformities. No edema. No calf tenderness.  Skin: Warm and dry.  Neurological:  Alert, awake, and appropriate.  Normal speech.  No acute focal neurological deficits are appreciated.  Psychiatric: Normal affect. Good eye contact. Appropriate in content.    ED Course    Procedures  ED Vital Signs:  Vitals:    09/22/18 2257 09/23/18 0102   BP: 110/65 (!) 95/50   Pulse: (!) 122 108   Resp: 20 17   Temp: 98.9 °F (37.2 °C) 98.4 °F (36.9 °C)   TempSrc: Oral Oral   SpO2: 95% 100%   Weight: 78.4 kg (172 lb 13.5 oz)    Height: 5' 4" (1.626 m)        Abnormal Lab Results:  Labs Reviewed   URINALYSIS - Abnormal; Notable for the following components:       Result Value    Specific Gravity, UA >=1.030 (*)     Protein, UA 2+ (*)     Ketones, UA Trace (*)     Bilirubin (UA) 1+ (*)     Occult Blood UA 2+ (*)     Leukocytes, UA 1+ (*)     All other components within normal " limits   CBC W/ AUTO DIFFERENTIAL - Abnormal; Notable for the following components:    WBC 27.37 (*)     RDW 14.7 (*)     Lymph% 5.0 (*)     All other components within normal limits   COMPREHENSIVE METABOLIC PANEL - Abnormal; Notable for the following components:    Potassium 3.3 (*)     CO2 20 (*)     Glucose 145 (*)     All other components within normal limits   URINALYSIS MICROSCOPIC - Abnormal; Notable for the following components:    RBC, UA 15 (*)     WBC, UA 55 (*)     Bacteria, UA Few (*)     All other components within normal limits   CULTURE, BLOOD   CULTURE, BLOOD   PREGNANCY TEST, URINE RAPID   LACTIC ACID, PLASMA        All Lab Results:  Results for orders placed or performed during the hospital encounter of 09/22/18   Urinalysis   Result Value Ref Range    Specimen UA Urine, Clean Catch     Color, UA Yellow Yellow, Straw, Radha    Appearance, UA Clear Clear    pH, UA 6.0 5.0 - 8.0    Specific Gravity, UA >=1.030 (A) 1.005 - 1.030    Protein, UA 2+ (A) Negative    Glucose, UA Negative Negative    Ketones, UA Trace (A) Negative    Bilirubin (UA) 1+ (A) Negative    Occult Blood UA 2+ (A) Negative    Nitrite, UA Negative Negative    Urobilinogen, UA Negative <2.0 EU/dL    Leukocytes, UA 1+ (A) Negative   Pregnancy, urine rapid (UPT)   Result Value Ref Range    Preg Test, Ur Negative    CBC auto differential   Result Value Ref Range    WBC 27.37 (H) 3.90 - 12.70 K/uL    RBC 4.18 4.00 - 5.40 M/uL    Hemoglobin 12.4 12.0 - 16.0 g/dL    Hematocrit 37.0 37.0 - 48.5 %    MCV 89 82 - 98 fL    MCH 29.7 27.0 - 31.0 pg    MCHC 33.5 32.0 - 36.0 g/dL    RDW 14.7 (H) 11.5 - 14.5 %    Platelets 255 150 - 350 K/uL    MPV 10.2 9.2 - 12.9 fL    Lymph # CANCELED 1.0 - 4.8 K/uL    Mono # CANCELED 0.3 - 1.0 K/uL    Eos # CANCELED 0.0 - 0.5 K/uL    Baso # CANCELED 0.00 - 0.20 K/uL    Gran% 63.0 38.0 - 73.0 %    Lymph% 5.0 (L) 18.0 - 48.0 %    Mono% 4.0 4.0 - 15.0 %    Eosinophil% 0.0 0.0 - 8.0 %    Basophil% 0.0 0.0 - 1.9 %     Bands 25.0 %    Metamyelocytes 3.0 %    Platelet Estimate Appears normal     Differential Method Manual    Comprehensive metabolic panel   Result Value Ref Range    Sodium 138 136 - 145 mmol/L    Potassium 3.3 (L) 3.5 - 5.1 mmol/L    Chloride 103 95 - 110 mmol/L    CO2 20 (L) 23 - 29 mmol/L    Glucose 145 (H) 70 - 110 mg/dL    BUN, Bld 13 6 - 20 mg/dL    Creatinine 1.0 0.5 - 1.4 mg/dL    Calcium 9.1 8.7 - 10.5 mg/dL    Total Protein 7.0 6.0 - 8.4 g/dL    Albumin 3.6 3.5 - 5.2 g/dL    Total Bilirubin 0.8 0.1 - 1.0 mg/dL    Alkaline Phosphatase 63 55 - 135 U/L    AST 16 10 - 40 U/L    ALT 13 10 - 44 U/L    Anion Gap 15 8 - 16 mmol/L    eGFR if African American >60 >60 mL/min/1.73 m^2    eGFR if non African American >60 >60 mL/min/1.73 m^2   Lactic acid, plasma   Result Value Ref Range    Lactate (Lactic Acid) 2.2 0.5 - 2.2 mmol/L   Urinalysis Microscopic   Result Value Ref Range    RBC, UA 15 (H) 0 - 4 /hpf    WBC, UA 55 (H) 0 - 5 /hpf    Bacteria, UA Few (A) None-Occ /hpf    Squam Epithel, UA 10 /hpf    Hyaline Casts, UA 0 0-1/lpf /lpf    Microscopic Comment SEE COMMENT          Imaging Results:  Imaging Results          CT Abdomen Pelvis  Without Contrast (In process)                Per ED physician, pt's CT abdomen pelvis without contrast results give the impression of trace pleural effusions. No renal calculus. No hydronephrosis. Subtle right perinephric stranding which ivis correlate with history of pyelonephritis.        The Emergency Provider reviewed the vital signs and test results, which are outlined above.    ED Discussion     1:49 AM: Discussed case with Dr. Paulino ([facility]). Dr. Paulino recommends repeating lactic acid in 4 hours and administering pt sepsis panel fluids.   Admitting Service: Hospital medicine   Admitting Physician: Dr. Paulino   Admit to: [facility] bed       1:51 AM: Re-evaluated pt. I have discussed test results, shared treatment plan, and the need for admission with patient  and family at bedside. Pt and family express understanding at this time and agree with all information. All questions answered. Pt and family have no further questions or concerns at this time. Pt is ready for admit.      ED Medication(s):  Medications   piperacillin-tazobactam 4.5 g in dextrose 5 % 100 mL IVPB (ready to mix system) (not administered)   sodium chloride 0.9% bolus 1,000 mL (not administered)   sodium chloride 0.9% bolus 1,000 mL (0 mLs Intravenous Stopped 9/23/18 0152)   morphine injection 4 mg (4 mg Intravenous Given 9/23/18 0056)   ondansetron injection 4 mg (4 mg Intravenous Given 9/23/18 0054)             Medical Decision Making    Medical Decision Making:   Clinical Tests:   Lab Tests: Ordered and Reviewed  Radiological Study: Reviewed and Ordered           Scribe Attestation:   Scribe #1: I performed the above scribed service and the documentation accurately describes the services I performed. I attest to the accuracy of the note.    Attending:   Physician Attestation Statement for Scribe #1: I, Jeri Cortés MD, personally performed the services described in this documentation, as scribed by Shellie Sorenson, in my presence, and it is both accurate and complete.          Clinical Impression       ICD-10-CM ICD-9-CM   1. Pyelonephritis N12 590.80   2. Hypotension, unspecified hypotension type I95.9 458.9       Disposition:   Disposition: Admitted  Condition: Stable         Jeri Cortés MD  09/26/18 0655

## 2021-01-11 ENCOUNTER — OFFICE VISIT (OUTPATIENT)
Dept: INTERNAL MEDICINE | Facility: CLINIC | Age: 21
End: 2021-01-11
Payer: COMMERCIAL

## 2021-01-11 VITALS
OXYGEN SATURATION: 100 % | DIASTOLIC BLOOD PRESSURE: 74 MMHG | HEIGHT: 64 IN | BODY MASS INDEX: 33.27 KG/M2 | HEART RATE: 98 BPM | TEMPERATURE: 99 F | WEIGHT: 194.88 LBS | SYSTOLIC BLOOD PRESSURE: 130 MMHG

## 2021-01-11 DIAGNOSIS — Z00.00 ROUTINE GENERAL MEDICAL EXAMINATION AT A HEALTH CARE FACILITY: Primary | ICD-10-CM

## 2021-01-11 PROCEDURE — 99999 PR PBB SHADOW E&M-EST. PATIENT-LVL III: CPT | Mod: PBBFAC,,, | Performed by: INTERNAL MEDICINE

## 2021-01-11 PROCEDURE — 3008F PR BODY MASS INDEX (BMI) DOCUMENTED: ICD-10-PCS | Mod: CPTII,S$GLB,, | Performed by: INTERNAL MEDICINE

## 2021-01-11 PROCEDURE — 99203 OFFICE O/P NEW LOW 30 MIN: CPT | Mod: S$GLB,,, | Performed by: INTERNAL MEDICINE

## 2021-01-11 PROCEDURE — 1126F AMNT PAIN NOTED NONE PRSNT: CPT | Mod: S$GLB,,, | Performed by: INTERNAL MEDICINE

## 2021-01-11 PROCEDURE — 99203 PR OFFICE/OUTPT VISIT, NEW, LEVL III, 30-44 MIN: ICD-10-PCS | Mod: S$GLB,,, | Performed by: INTERNAL MEDICINE

## 2021-01-11 PROCEDURE — 1126F PR PAIN SEVERITY QUANTIFIED, NO PAIN PRESENT: ICD-10-PCS | Mod: S$GLB,,, | Performed by: INTERNAL MEDICINE

## 2021-01-11 PROCEDURE — 99999 PR PBB SHADOW E&M-EST. PATIENT-LVL III: ICD-10-PCS | Mod: PBBFAC,,, | Performed by: INTERNAL MEDICINE

## 2021-01-11 PROCEDURE — 3008F BODY MASS INDEX DOCD: CPT | Mod: CPTII,S$GLB,, | Performed by: INTERNAL MEDICINE

## 2021-04-29 ENCOUNTER — PATIENT MESSAGE (OUTPATIENT)
Dept: RESEARCH | Facility: HOSPITAL | Age: 21
End: 2021-04-29

## 2021-12-29 NOTE — H&P (VIEW-ONLY)
Subjective:       Patient ID: Jeremiah Medina is a 18 y.o. female.    Chief Complaint: Nephrolithiasis    Jeremiah Medina is a 18 y.o. Female here for kidney stones.   4 days ago, she had lower abdominal pain and then later that night sharp stabbing pain.   Pain resolved. Then 11pm last pm, continued pain and went to ER.     No fever. No n/v.     No previous hx of stones.   No family hx of stones.     No medical problems.     She plays volleyball for [employer].     She drinks mostly water for the last month. 60 ounces a day.   Over the summer a lot of water.   No MVI.   She takes a good big of ibuprofen.       No recurrent UTI.     No tums or rolaids.     Minimal oxalate.   Good bit of salt.         History reviewed. No pertinent surgical history.    History reviewed. No pertinent past medical history.    Social History     Socioeconomic History    Marital status: Single     Spouse name: Not on file    Number of children: Not on file    Years of education: Not on file    Highest education level: Not on file   Social Needs    Financial resource strain: Not on file    Food insecurity - worry: Not on file    Food insecurity - inability: Not on file    Transportation needs - medical: Not on file    Transportation needs - non-medical: Not on file   Occupational History    Not on file   Tobacco Use    Smoking status: Never Smoker    Smokeless tobacco: Never Used   Substance and Sexual Activity    Alcohol use: No     Frequency: Never    Drug use: No    Sexual activity: No   Other Topics Concern    Not on file   Social History Narrative    Not on file       Family History   Problem Relation Age of Onset    Diabetes Maternal Grandmother        Current Outpatient Medications   Medication Sig Dispense Refill    acetaminophen-codeine 300-30mg (TYLENOL #3) 300-30 mg Tab Take 1 tablet by mouth every 6 (six) hours as needed. FOR SEVERE PAIN  0     mg tablet TAKE ONE TABLET BY MOUTH THREE TIMES DAILY (start  AFTER ketorolac)  0    ISOtretinoin (ACCUTANE) 40 MG capsule Take 40 mg by mouth 2 (two) times daily.      ondansetron (ZOFRAN) 8 MG tablet Take 1 tablet (8 mg total) by mouth every 8 (eight) hours as needed for Nausea. 20 tablet 0    oxyCODONE-acetaminophen (PERCOCET) 5-325 mg per tablet Take 1 tablet by mouth every 4 (four) hours as needed for Pain. 15 tablet 0    tamsulosin (FLOMAX) 0.4 mg Cap Take 1 capsule (0.4 mg total) by mouth once daily. 20 capsule 0    VENTOLIN HFA 90 mcg/actuation inhaler INHALE TWO TO FOUR PUFFS 30 minutes prior TO exercise  2     No current facility-administered medications for this visit.        Review of patient's allergies indicates:  No Known Allergies     BMP  Lab Results   Component Value Date     09/04/2018    K 4.0 09/04/2018     09/04/2018    CO2 22 (L) 09/04/2018    BUN 16 09/04/2018    CREATININE 1.1 09/04/2018    CALCIUM 9.5 09/04/2018    ANIONGAP 10 09/04/2018    ESTGFRAFRICA >60.0 09/04/2018    EGFRNONAA >60.0 09/04/2018       Review of Systems   Constitutional: Negative for chills, fever and unexpected weight change.   HENT: Negative for nosebleeds.    Eyes: Negative for visual disturbance.   Respiratory: Negative for chest tightness.    Cardiovascular: Negative for chest pain.   Gastrointestinal: Negative for diarrhea.   Genitourinary: Negative for dysuria, frequency, nocturia and urgency.   Musculoskeletal: Positive for back pain. Negative for myalgias.   Skin: Negative for rash.   Neurological: Negative for seizures.   Hematological: Does not bruise/bleed easily.   Psychiatric/Behavioral: Negative for behavioral problems.     Objective:      Physical Exam   Vitals reviewed.  Constitutional: She is oriented to person, place, and time. She appears well-developed and well-nourished.   HENT:   Head: Normocephalic and atraumatic.   Eyes: No scleral icterus.   Cardiovascular: Normal rate and regular rhythm.    Pulmonary/Chest: Effort normal. No stridor. No  respiratory distress.   Abdominal: She exhibits no distension.   Musculoskeletal: She exhibits no deformity.   Neurological: She is alert and oriented to person, place, and time.   Skin: Skin is warm and dry. No rash noted.     Psychiatric: She has a normal mood and affect.     urine dip trace protein, pH 6  Assessment:       1. Kidney stones        Plan:   Jeremiah was seen today for nephrolithiasis.    Diagnoses and all orders for this visit:    Kidney stones  -     PTH, intact; Future  -     Uric acid; Future  -     X-Ray Abdomen AP 1 View; Future    Other orders  -     ketorolac (TORADOL) 10 mg tablet; Take 1 tablet (10 mg total) by mouth every 6 (six) hours as needed for Pain.      Low oxalate diet (limit spinach, rhubarb, nuts, beets, potatoes, chocolate)  2-3 liters of water a day. (avoid sodas, iced tea)  Limit salt.  Lots of fruits and veggies.  Limit non-dairy animal protein  Make sure you have milk and or yogurt in the diet  No tums, rolaids, vitamin C supplements, Multivitamin    Consider right ESWL/ureteroscopy as patient is a  sooner than later and has to travel with team.   Needs metabolic workup in future.   Needs KUB - got contrast in ER. KUB on Thursday.          Field Size (Applicator): 2.0 cm

## 2022-01-21 ENCOUNTER — LAB VISIT (OUTPATIENT)
Dept: LAB | Facility: HOSPITAL | Age: 22
End: 2022-01-21
Attending: FAMILY MEDICINE
Payer: COMMERCIAL

## 2022-01-21 ENCOUNTER — OFFICE VISIT (OUTPATIENT)
Dept: INTERNAL MEDICINE | Facility: CLINIC | Age: 22
End: 2022-01-21
Payer: COMMERCIAL

## 2022-01-21 VITALS
DIASTOLIC BLOOD PRESSURE: 72 MMHG | SYSTOLIC BLOOD PRESSURE: 119 MMHG | OXYGEN SATURATION: 98 % | BODY MASS INDEX: 36.2 KG/M2 | TEMPERATURE: 99 F | HEIGHT: 64 IN | HEART RATE: 92 BPM | WEIGHT: 212.06 LBS

## 2022-01-21 DIAGNOSIS — Z00.00 ROUTINE GENERAL MEDICAL EXAMINATION AT A HEALTH CARE FACILITY: Primary | ICD-10-CM

## 2022-01-21 DIAGNOSIS — Z00.00 ROUTINE GENERAL MEDICAL EXAMINATION AT A HEALTH CARE FACILITY: ICD-10-CM

## 2022-01-21 LAB
ALBUMIN SERPL BCP-MCNC: 4.3 G/DL (ref 3.5–5.2)
ALP SERPL-CCNC: 61 U/L (ref 55–135)
ALT SERPL W/O P-5'-P-CCNC: 19 U/L (ref 10–44)
ANION GAP SERPL CALC-SCNC: 9 MMOL/L (ref 8–16)
AST SERPL-CCNC: 17 U/L (ref 10–40)
BASOPHILS # BLD AUTO: 0.06 K/UL (ref 0–0.2)
BASOPHILS NFR BLD: 0.8 % (ref 0–1.9)
BILIRUB SERPL-MCNC: 0.5 MG/DL (ref 0.1–1)
BUN SERPL-MCNC: 13 MG/DL (ref 6–20)
CALCIUM SERPL-MCNC: 10 MG/DL (ref 8.7–10.5)
CHLORIDE SERPL-SCNC: 107 MMOL/L (ref 95–110)
CHOLEST SERPL-MCNC: 186 MG/DL (ref 120–199)
CHOLEST/HDLC SERPL: 2.8 {RATIO} (ref 2–5)
CO2 SERPL-SCNC: 24 MMOL/L (ref 23–29)
CREAT SERPL-MCNC: 0.9 MG/DL (ref 0.5–1.4)
DIFFERENTIAL METHOD: NORMAL
EOSINOPHIL # BLD AUTO: 0.3 K/UL (ref 0–0.5)
EOSINOPHIL NFR BLD: 3.3 % (ref 0–8)
ERYTHROCYTE [DISTWIDTH] IN BLOOD BY AUTOMATED COUNT: 12.9 % (ref 11.5–14.5)
EST. GFR  (AFRICAN AMERICAN): >60 ML/MIN/1.73 M^2
EST. GFR  (NON AFRICAN AMERICAN): >60 ML/MIN/1.73 M^2
ESTIMATED AVG GLUCOSE: 94 MG/DL (ref 68–131)
GLUCOSE SERPL-MCNC: 102 MG/DL (ref 70–110)
HBA1C MFR BLD: 4.9 % (ref 4–5.6)
HCT VFR BLD AUTO: 42.9 % (ref 37–48.5)
HDLC SERPL-MCNC: 66 MG/DL (ref 40–75)
HDLC SERPL: 35.5 % (ref 20–50)
HGB BLD-MCNC: 14.1 G/DL (ref 12–16)
IMM GRANULOCYTES # BLD AUTO: 0.02 K/UL (ref 0–0.04)
IMM GRANULOCYTES NFR BLD AUTO: 0.3 % (ref 0–0.5)
LDLC SERPL CALC-MCNC: 108 MG/DL (ref 63–159)
LYMPHOCYTES # BLD AUTO: 2.4 K/UL (ref 1–4.8)
LYMPHOCYTES NFR BLD: 31.1 % (ref 18–48)
MCH RBC QN AUTO: 30 PG (ref 27–31)
MCHC RBC AUTO-ENTMCNC: 32.9 G/DL (ref 32–36)
MCV RBC AUTO: 91 FL (ref 82–98)
MONOCYTES # BLD AUTO: 0.6 K/UL (ref 0.3–1)
MONOCYTES NFR BLD: 7.7 % (ref 4–15)
NEUTROPHILS # BLD AUTO: 4.5 K/UL (ref 1.8–7.7)
NEUTROPHILS NFR BLD: 56.8 % (ref 38–73)
NONHDLC SERPL-MCNC: 120 MG/DL
NRBC BLD-RTO: 0 /100 WBC
PLATELET # BLD AUTO: 368 K/UL (ref 150–450)
PMV BLD AUTO: 10.9 FL (ref 9.2–12.9)
POTASSIUM SERPL-SCNC: 4.8 MMOL/L (ref 3.5–5.1)
PROT SERPL-MCNC: 7.3 G/DL (ref 6–8.4)
RBC # BLD AUTO: 4.7 M/UL (ref 4–5.4)
SODIUM SERPL-SCNC: 140 MMOL/L (ref 136–145)
T4 FREE SERPL-MCNC: 0.88 NG/DL (ref 0.71–1.51)
TRIGL SERPL-MCNC: 60 MG/DL (ref 30–150)
TSH SERPL DL<=0.005 MIU/L-ACNC: 2.75 UIU/ML (ref 0.4–4)
WBC # BLD AUTO: 7.82 K/UL (ref 3.9–12.7)

## 2022-01-21 PROCEDURE — 80061 LIPID PANEL: CPT | Performed by: FAMILY MEDICINE

## 2022-01-21 PROCEDURE — 1159F MED LIST DOCD IN RCRD: CPT | Mod: CPTII,S$GLB,, | Performed by: FAMILY MEDICINE

## 2022-01-21 PROCEDURE — 3008F PR BODY MASS INDEX (BMI) DOCUMENTED: ICD-10-PCS | Mod: CPTII,S$GLB,, | Performed by: FAMILY MEDICINE

## 2022-01-21 PROCEDURE — 3044F PR MOST RECENT HEMOGLOBIN A1C LEVEL <7.0%: ICD-10-PCS | Mod: CPTII,S$GLB,, | Performed by: FAMILY MEDICINE

## 2022-01-21 PROCEDURE — 3074F PR MOST RECENT SYSTOLIC BLOOD PRESSURE < 130 MM HG: ICD-10-PCS | Mod: CPTII,S$GLB,, | Performed by: FAMILY MEDICINE

## 2022-01-21 PROCEDURE — 3078F PR MOST RECENT DIASTOLIC BLOOD PRESSURE < 80 MM HG: ICD-10-PCS | Mod: CPTII,S$GLB,, | Performed by: FAMILY MEDICINE

## 2022-01-21 PROCEDURE — 85025 COMPLETE CBC W/AUTO DIFF WBC: CPT | Performed by: FAMILY MEDICINE

## 2022-01-21 PROCEDURE — 3044F HG A1C LEVEL LT 7.0%: CPT | Mod: CPTII,S$GLB,, | Performed by: FAMILY MEDICINE

## 2022-01-21 PROCEDURE — 99395 PREV VISIT EST AGE 18-39: CPT | Mod: 25,S$GLB,, | Performed by: FAMILY MEDICINE

## 2022-01-21 PROCEDURE — 1159F PR MEDICATION LIST DOCUMENTED IN MEDICAL RECORD: ICD-10-PCS | Mod: CPTII,S$GLB,, | Performed by: FAMILY MEDICINE

## 2022-01-21 PROCEDURE — 36415 COLL VENOUS BLD VENIPUNCTURE: CPT | Mod: PO | Performed by: FAMILY MEDICINE

## 2022-01-21 PROCEDURE — 3074F SYST BP LT 130 MM HG: CPT | Mod: CPTII,S$GLB,, | Performed by: FAMILY MEDICINE

## 2022-01-21 PROCEDURE — 90686 FLU VACCINE (QUAD) GREATER THAN OR EQUAL TO 3YO PRESERVATIVE FREE IM: ICD-10-PCS | Mod: S$GLB,,, | Performed by: FAMILY MEDICINE

## 2022-01-21 PROCEDURE — 99395 PR PREVENTIVE VISIT,EST,18-39: ICD-10-PCS | Mod: 25,S$GLB,, | Performed by: FAMILY MEDICINE

## 2022-01-21 PROCEDURE — 90471 FLU VACCINE (QUAD) GREATER THAN OR EQUAL TO 3YO PRESERVATIVE FREE IM: ICD-10-PCS | Mod: S$GLB,,, | Performed by: FAMILY MEDICINE

## 2022-01-21 PROCEDURE — 90471 IMMUNIZATION ADMIN: CPT | Mod: S$GLB,,, | Performed by: FAMILY MEDICINE

## 2022-01-21 PROCEDURE — 99999 PR PBB SHADOW E&M-EST. PATIENT-LVL III: ICD-10-PCS | Mod: PBBFAC,,, | Performed by: FAMILY MEDICINE

## 2022-01-21 PROCEDURE — 84439 ASSAY OF FREE THYROXINE: CPT | Performed by: FAMILY MEDICINE

## 2022-01-21 PROCEDURE — 90686 IIV4 VACC NO PRSV 0.5 ML IM: CPT | Mod: S$GLB,,, | Performed by: FAMILY MEDICINE

## 2022-01-21 PROCEDURE — 84443 ASSAY THYROID STIM HORMONE: CPT | Performed by: FAMILY MEDICINE

## 2022-01-21 PROCEDURE — 3008F BODY MASS INDEX DOCD: CPT | Mod: CPTII,S$GLB,, | Performed by: FAMILY MEDICINE

## 2022-01-21 PROCEDURE — 3078F DIAST BP <80 MM HG: CPT | Mod: CPTII,S$GLB,, | Performed by: FAMILY MEDICINE

## 2022-01-21 PROCEDURE — 80053 COMPREHEN METABOLIC PANEL: CPT | Performed by: FAMILY MEDICINE

## 2022-01-21 PROCEDURE — 99999 PR PBB SHADOW E&M-EST. PATIENT-LVL III: CPT | Mod: PBBFAC,,, | Performed by: FAMILY MEDICINE

## 2022-01-21 PROCEDURE — 83036 HEMOGLOBIN GLYCOSYLATED A1C: CPT | Performed by: FAMILY MEDICINE

## 2022-01-21 RX ORDER — IBUPROFEN 200 MG
200 TABLET ORAL EVERY 6 HOURS PRN
COMMUNITY

## 2022-01-23 NOTE — PROGRESS NOTES
Subjective:      Patient ID: Jeremiah Medina is a 21 y.o. female.    Chief Complaint: Excessive Sweating, Fatigue, Tachycardia, Weight Loss, and Establish Care      Patient here today for routine annual wellness exam and to establish care.  She does report increased fatigue, sweating with even mild exertion, weight gain about 50 lb in the past 2 years    Excessive Sweating  Associated symptoms include diaphoresis and fatigue. Pertinent negatives include no abdominal pain or coughing.   Fatigue  Associated symptoms include diaphoresis and fatigue. Pertinent negatives include no abdominal pain or coughing.     Review of Systems   Constitutional: Positive for diaphoresis, fatigue and unexpected weight change.   Respiratory: Negative for cough and shortness of breath.    Cardiovascular: Positive for palpitations.   Gastrointestinal: Negative for abdominal pain.     Past Medical History:   Diagnosis Date    Asthma     exercise induced    Kidney stone           Past Surgical History:   Procedure Laterality Date    CYSTOSCOPY W/ RETROGRADES Right 9/14/2018    Procedure: CYSTOSCOPY, WITH RETROGRADE PYELOGRAM;  Surgeon: Mian Childs MD;  Location: [address];  Service: Urology;  Laterality: Right;    CYSTOSCOPY W/ URETERAL STENT PLACEMENT Right 9/14/2018    Procedure: CYSTOSCOPY, WITH URETERAL STENT INSERTION;  Surgeon: Mian Childs MD;  Location: [address];  Service: Urology;  Laterality: Right;    URETEROSCOPIC REMOVAL OF URETERIC CALCULUS Right 9/14/2018    Procedure: REMOVAL, CALCULUS, URETER, URETEROSCOPIC;  Surgeon: Mian Childs MD;  Location: [address];  Service: Urology;  Laterality: Right;  1.5 hours    WISDOM TOOTH EXTRACTION       Family History   Problem Relation Age of Onset    Diabetes Maternal Grandmother      Social History     Socioeconomic History    Marital status: Single   Tobacco Use    Smoking status: Never Smoker    Smokeless tobacco: Never Used  "  Substance and Sexual Activity    Alcohol use: Yes    Drug use: No    Sexual activity: Never     Review of patient's allergies indicates:  No Known Allergies    Objective:       /72 (BP Location: Left arm, Patient Position: Sitting, BP Method: Large (Automatic))   Pulse 92   Temp 98.6 °F (37 °C) (Tympanic)   Ht 5' 4" (1.626 m)   Wt 96.2 kg (212 lb 1.3 oz)   SpO2 98%   BMI 36.40 kg/m²   Physical Exam  Vitals reviewed.   Constitutional:       General: She is not in acute distress.     Appearance: Normal appearance. She is well-developed. She is not ill-appearing or diaphoretic.   HENT:      Head: Normocephalic and atraumatic.      Right Ear: Hearing, tympanic membrane, ear canal and external ear normal.      Left Ear: Hearing, tympanic membrane, ear canal and external ear normal.      Nose: Nose normal.      Mouth/Throat:      Pharynx: Uvula midline. No oropharyngeal exudate.   Eyes:      Conjunctiva/sclera: Conjunctivae normal.      Pupils: Pupils are equal, round, and reactive to light.   Neck:      Thyroid: No thyromegaly.      Trachea: No tracheal deviation.   Cardiovascular:      Rate and Rhythm: Normal rate and regular rhythm.      Heart sounds: Normal heart sounds. No murmur heard.      Pulmonary:      Effort: Pulmonary effort is normal. No respiratory distress.      Breath sounds: Normal breath sounds.   Abdominal:      General: Bowel sounds are normal.      Palpations: Abdomen is soft.      Tenderness: There is no abdominal tenderness. There is no guarding.      Hernia: No hernia is present.   Musculoskeletal:         General: Normal range of motion.      Cervical back: Normal range of motion and neck supple.   Lymphadenopathy:      Cervical: No cervical adenopathy.   Skin:     General: Skin is warm and dry.      Capillary Refill: Capillary refill takes less than 2 seconds.   Neurological:      General: No focal deficit present.      Mental Status: She is alert and oriented to person, place, " and time.   Psychiatric:         Mood and Affect: Mood normal.         Behavior: Behavior normal.         Thought Content: Thought content normal.         Judgment: Judgment normal.       Assessment:     1. Routine general medical examination at a health care facility      Plan:   Routine general medical examination at a health care facility  -     CBC Auto Differential; Future; Expected date: 01/21/2022  -     Comprehensive Metabolic Panel; Future; Expected date: 01/21/2022  -     Lipid Panel; Future; Expected date: 01/21/2022  -     Hemoglobin A1C; Future; Expected date: 01/21/2022  -     TSH; Future; Expected date: 01/21/2022  -     T4, Free; Future; Expected date: 01/21/2022    Other orders  -     Influenza - Quadrivalent (PF)      Medication List with Changes/Refills   Current Medications    ACETAMINOPHEN (TYLENOL) 500 MG TABLET    Take 500 mg by mouth every 6 (six) hours as needed for Pain.    IBUPROFEN (ADVIL,MOTRIN) 200 MG TABLET    Take 200 mg by mouth every 6 (six) hours as needed for Pain.   Discontinued Medications    ACETAMINOPHEN-CODEINE 300-30MG (TYLENOL #3) 300-30 MG TAB    Take 1 tablet by mouth every 6 (six) hours as needed. FOR SEVERE PAIN    ISOTRETINOIN (ACCUTANE) 40 MG CAPSULE    Take 40 mg by mouth 2 (two) times daily.    KETOROLAC (TORADOL) 10 MG TABLET    Take 1 tablet (10 mg total) by mouth every 6 (six) hours as needed for Pain.    MELOXICAM (MOBIC) 15 MG TABLET    Take 1 tablet (15 mg total) by mouth once daily.    ONDANSETRON (ZOFRAN) 8 MG TABLET    Take 1 tablet (8 mg total) by mouth every 8 (eight) hours as needed for Nausea.    OXYCODONE-ACETAMINOPHEN (PERCOCET) 5-325 MG PER TABLET    Take 1 tablet by mouth every 4 (four) hours as needed for Pain.    TAMSULOSIN (FLOMAX) 0.4 MG CAP    Take 1 capsule (0.4 mg total) by mouth once daily.    VENTOLIN HFA 90 MCG/ACTUATION INHALER    INHALE TWO TO FOUR PUFFS 30 minutes prior TO exercise

## 2022-05-31 ENCOUNTER — OFFICE VISIT (OUTPATIENT)
Dept: INTERNAL MEDICINE | Facility: CLINIC | Age: 22
End: 2022-05-31
Payer: COMMERCIAL

## 2022-05-31 ENCOUNTER — HOSPITAL ENCOUNTER (OUTPATIENT)
Dept: RADIOLOGY | Facility: HOSPITAL | Age: 22
Discharge: HOME | End: 2022-05-31
Attending: FAMILY MEDICINE
Payer: COMMERCIAL

## 2022-05-31 VITALS
WEIGHT: 223.75 LBS | TEMPERATURE: 98 F | HEART RATE: 72 BPM | OXYGEN SATURATION: 98 % | HEIGHT: 64 IN | BODY MASS INDEX: 38.2 KG/M2 | DIASTOLIC BLOOD PRESSURE: 72 MMHG | SYSTOLIC BLOOD PRESSURE: 124 MMHG

## 2022-05-31 DIAGNOSIS — J90 BILATERAL PLEURAL EFFUSION: Primary | ICD-10-CM

## 2022-05-31 DIAGNOSIS — J90 BILATERAL PLEURAL EFFUSION: ICD-10-CM

## 2022-05-31 PROCEDURE — 3044F HG A1C LEVEL LT 7.0%: CPT | Mod: CPTII,S$GLB,, | Performed by: FAMILY MEDICINE

## 2022-05-31 PROCEDURE — 71046 X-RAY EXAM CHEST 2 VIEWS: CPT | Mod: 26,,, | Performed by: RADIOLOGY

## 2022-05-31 PROCEDURE — 71046 XR CHEST PA AND LATERAL: ICD-10-PCS | Mod: 26,,, | Performed by: RADIOLOGY

## 2022-05-31 PROCEDURE — 99213 OFFICE O/P EST LOW 20 MIN: CPT | Mod: S$GLB,,, | Performed by: FAMILY MEDICINE

## 2022-05-31 PROCEDURE — 99999 PR PBB SHADOW E&M-EST. PATIENT-LVL IV: ICD-10-PCS | Mod: PBBFAC,,, | Performed by: FAMILY MEDICINE

## 2022-05-31 PROCEDURE — 71046 X-RAY EXAM CHEST 2 VIEWS: CPT | Mod: TC,PO

## 2022-05-31 PROCEDURE — 1159F MED LIST DOCD IN RCRD: CPT | Mod: CPTII,S$GLB,, | Performed by: FAMILY MEDICINE

## 2022-05-31 PROCEDURE — 3078F PR MOST RECENT DIASTOLIC BLOOD PRESSURE < 80 MM HG: ICD-10-PCS | Mod: CPTII,S$GLB,, | Performed by: FAMILY MEDICINE

## 2022-05-31 PROCEDURE — 3078F DIAST BP <80 MM HG: CPT | Mod: CPTII,S$GLB,, | Performed by: FAMILY MEDICINE

## 2022-05-31 PROCEDURE — 3074F SYST BP LT 130 MM HG: CPT | Mod: CPTII,S$GLB,, | Performed by: FAMILY MEDICINE

## 2022-05-31 PROCEDURE — 3044F PR MOST RECENT HEMOGLOBIN A1C LEVEL <7.0%: ICD-10-PCS | Mod: CPTII,S$GLB,, | Performed by: FAMILY MEDICINE

## 2022-05-31 PROCEDURE — 99213 PR OFFICE/OUTPT VISIT, EST, LEVL III, 20-29 MIN: ICD-10-PCS | Mod: S$GLB,,, | Performed by: FAMILY MEDICINE

## 2022-05-31 PROCEDURE — 3008F BODY MASS INDEX DOCD: CPT | Mod: CPTII,S$GLB,, | Performed by: FAMILY MEDICINE

## 2022-05-31 PROCEDURE — 3008F PR BODY MASS INDEX (BMI) DOCUMENTED: ICD-10-PCS | Mod: CPTII,S$GLB,, | Performed by: FAMILY MEDICINE

## 2022-05-31 PROCEDURE — 3074F PR MOST RECENT SYSTOLIC BLOOD PRESSURE < 130 MM HG: ICD-10-PCS | Mod: CPTII,S$GLB,, | Performed by: FAMILY MEDICINE

## 2022-05-31 PROCEDURE — 1159F PR MEDICATION LIST DOCUMENTED IN MEDICAL RECORD: ICD-10-PCS | Mod: CPTII,S$GLB,, | Performed by: FAMILY MEDICINE

## 2022-05-31 PROCEDURE — 99999 PR PBB SHADOW E&M-EST. PATIENT-LVL IV: CPT | Mod: PBBFAC,,, | Performed by: FAMILY MEDICINE

## 2022-05-31 RX ORDER — HYDROCODONE BITARTRATE AND ACETAMINOPHEN 5; 325 MG/1; MG/1
1 TABLET ORAL EVERY 6 HOURS PRN
COMMUNITY
Start: 2022-05-25 | End: 2022-09-30

## 2022-05-31 RX ORDER — ONDANSETRON 4 MG/1
4 TABLET, ORALLY DISINTEGRATING ORAL EVERY 8 HOURS PRN
COMMUNITY
Start: 2022-05-25 | End: 2024-01-25

## 2022-05-31 RX ORDER — TAMSULOSIN HYDROCHLORIDE 0.4 MG/1
1 CAPSULE ORAL DAILY
COMMUNITY
Start: 2022-05-25 | End: 2024-01-25

## 2022-06-01 ENCOUNTER — PATIENT MESSAGE (OUTPATIENT)
Dept: INTERNAL MEDICINE | Facility: CLINIC | Age: 22
End: 2022-06-01
Payer: COMMERCIAL

## 2022-06-01 DIAGNOSIS — J90 BILATERAL PLEURAL EFFUSION: Primary | ICD-10-CM

## 2022-06-02 NOTE — PROGRESS NOTES
Subjective:      Patient ID: Jeremiah Medina is a 22 y.o. female.    Chief Complaint: Follow-up (ER follow up)      Patient reports recent trip several days ago to the [facility] ER for flank pain, diagnosed with renal stone, scheduled for lithotripsy procedure next week with .  Incidental finding on her CT bilateral pleural effusions-I do not have the records to review-patient reports only having had CT of abdomen, not of chest.  She is not having any coughing or shortness of breath or other respiratory symptoms.    Review of Systems   HENT: Negative for congestion.    Respiratory: Negative for cough, shortness of breath and wheezing.      Past Medical History:   Diagnosis Date    Asthma     exercise induced    Kidney stone     Kidney stone 05/25/2022    Pyelonephritis 09/2018          Past Surgical History:   Procedure Laterality Date    CYSTOSCOPY W/ RETROGRADES Right 9/14/2018    Procedure: CYSTOSCOPY, WITH RETROGRADE PYELOGRAM;  Surgeon: Mian Childs MD;  Location: [facility] OR [address] [place] [address];  Service: Urology;  Laterality: Right;    CYSTOSCOPY W/ URETERAL STENT PLACEMENT Right 9/14/2018    Procedure: CYSTOSCOPY, WITH URETERAL STENT INSERTION;  Surgeon: Mian Childs MD;  Location: [facility] OR [address] [place] [address];  Service: Urology;  Laterality: Right;    URETEROSCOPIC REMOVAL OF URETERIC CALCULUS Right 9/14/2018    Procedure: REMOVAL, CALCULUS, URETER, URETEROSCOPIC;  Surgeon: Mian Childs MD;  Location: [facility] OR [address] [place] [address];  Service: Urology;  Laterality: Right;  1.5 hours    WISDOM TOOTH EXTRACTION       Family History   Problem Relation Age of Onset    Diabetes Maternal Grandmother      Social History     Socioeconomic History    Marital status: Single   Tobacco Use    Smoking status: Never Smoker    Smokeless tobacco: Never Used   Substance and Sexual Activity    Alcohol use: Yes    Drug use: No    Sexual activity: Never     Review of patient's allergies indicates:  No Known  "Allergies    Objective:       /72 (BP Location: Right arm, Patient Position: Sitting, BP Method: Medium (Manual))   Pulse 72   Temp 98.1 °F (36.7 °C) (Temporal)   Ht 5' 4" (1.626 m)   Wt 101.5 kg (223 lb 12.3 oz)   SpO2 98%   BMI 38.41 kg/m²   Physical Exam  Constitutional:       General: She is not in acute distress.     Appearance: Normal appearance. She is well-developed. She is not ill-appearing or diaphoretic.   Cardiovascular:      Rate and Rhythm: Normal rate and regular rhythm.      Heart sounds: Normal heart sounds.   Pulmonary:      Effort: Pulmonary effort is normal.      Breath sounds: Normal breath sounds.   Neurological:      Mental Status: She is alert and oriented to person, place, and time.   Psychiatric:         Mood and Affect: Mood normal.         Behavior: Behavior normal.         Thought Content: Thought content normal.         Judgment: Judgment normal.       Assessment:     1. Bilateral pleural effusion      Plan:   Bilateral pleural effusion  -     X-Ray Chest PA And Lateral; Future; Expected date: 05/31/2022    X-ray today shows trace blunting of costophrenic angles bilaterally, will plan to recheck in about a month  Will request records from [facility]  Medication List with Changes/Refills   Current Medications    ACETAMINOPHEN (TYLENOL) 500 MG TABLET    Take 500 mg by mouth every 6 (six) hours as needed for Pain.    HYDROCODONE-ACETAMINOPHEN (NORCO) 5-325 MG PER TABLET    Take 1 tablet by mouth every 6 (six) hours as needed.    IBUPROFEN (ADVIL,MOTRIN) 200 MG TABLET    Take 200 mg by mouth every 6 (six) hours as needed for Pain.    ONDANSETRON (ZOFRAN-ODT) 4 MG TBDL    Take 4 mg by mouth every 8 (eight) hours as needed.    TAMSULOSIN (FLOMAX) 0.4 MG CAP    Take 1 capsule by mouth once daily.       "

## 2022-06-30 ENCOUNTER — HOSPITAL ENCOUNTER (OUTPATIENT)
Dept: RADIOLOGY | Facility: HOSPITAL | Age: 22
Discharge: HOME | End: 2022-06-30
Attending: FAMILY MEDICINE
Payer: COMMERCIAL

## 2022-06-30 DIAGNOSIS — J90 BILATERAL PLEURAL EFFUSION: Primary | ICD-10-CM

## 2022-06-30 DIAGNOSIS — J90 BILATERAL PLEURAL EFFUSION: ICD-10-CM

## 2022-06-30 PROCEDURE — 71046 XR CHEST PA AND LATERAL: ICD-10-PCS | Mod: 26,,, | Performed by: RADIOLOGY

## 2022-06-30 PROCEDURE — 71046 X-RAY EXAM CHEST 2 VIEWS: CPT | Mod: 26,,, | Performed by: RADIOLOGY

## 2022-06-30 PROCEDURE — 71046 X-RAY EXAM CHEST 2 VIEWS: CPT | Mod: TC,PO

## 2022-07-01 ENCOUNTER — OFFICE VISIT (OUTPATIENT)
Dept: PULMONOLOGY | Facility: CLINIC | Age: 22
End: 2022-07-01
Payer: COMMERCIAL

## 2022-07-01 VITALS
OXYGEN SATURATION: 98 % | SYSTOLIC BLOOD PRESSURE: 120 MMHG | WEIGHT: 224.88 LBS | DIASTOLIC BLOOD PRESSURE: 84 MMHG | BODY MASS INDEX: 37.47 KG/M2 | HEART RATE: 76 BPM | HEIGHT: 65 IN

## 2022-07-01 DIAGNOSIS — J90 PLEURAL EFFUSION: ICD-10-CM

## 2022-07-01 DIAGNOSIS — G47.33 OSA (OBSTRUCTIVE SLEEP APNEA): ICD-10-CM

## 2022-07-01 DIAGNOSIS — J90 BILATERAL PLEURAL EFFUSION: ICD-10-CM

## 2022-07-01 PROCEDURE — 3079F PR MOST RECENT DIASTOLIC BLOOD PRESSURE 80-89 MM HG: ICD-10-PCS | Mod: CPTII,S$GLB,, | Performed by: INTERNAL MEDICINE

## 2022-07-01 PROCEDURE — 3074F PR MOST RECENT SYSTOLIC BLOOD PRESSURE < 130 MM HG: ICD-10-PCS | Mod: CPTII,S$GLB,, | Performed by: INTERNAL MEDICINE

## 2022-07-01 PROCEDURE — 3074F SYST BP LT 130 MM HG: CPT | Mod: CPTII,S$GLB,, | Performed by: INTERNAL MEDICINE

## 2022-07-01 PROCEDURE — 3079F DIAST BP 80-89 MM HG: CPT | Mod: CPTII,S$GLB,, | Performed by: INTERNAL MEDICINE

## 2022-07-01 PROCEDURE — 3008F BODY MASS INDEX DOCD: CPT | Mod: CPTII,S$GLB,, | Performed by: INTERNAL MEDICINE

## 2022-07-01 PROCEDURE — 1159F MED LIST DOCD IN RCRD: CPT | Mod: CPTII,S$GLB,, | Performed by: INTERNAL MEDICINE

## 2022-07-01 PROCEDURE — 1159F PR MEDICATION LIST DOCUMENTED IN MEDICAL RECORD: ICD-10-PCS | Mod: CPTII,S$GLB,, | Performed by: INTERNAL MEDICINE

## 2022-07-01 PROCEDURE — 3008F PR BODY MASS INDEX (BMI) DOCUMENTED: ICD-10-PCS | Mod: CPTII,S$GLB,, | Performed by: INTERNAL MEDICINE

## 2022-07-01 PROCEDURE — 99203 OFFICE O/P NEW LOW 30 MIN: CPT | Mod: S$GLB,,, | Performed by: INTERNAL MEDICINE

## 2022-07-01 PROCEDURE — 99203 PR OFFICE/OUTPT VISIT, NEW, LEVL III, 30-44 MIN: ICD-10-PCS | Mod: S$GLB,,, | Performed by: INTERNAL MEDICINE

## 2022-07-01 PROCEDURE — 3044F PR MOST RECENT HEMOGLOBIN A1C LEVEL <7.0%: ICD-10-PCS | Mod: CPTII,S$GLB,, | Performed by: INTERNAL MEDICINE

## 2022-07-01 PROCEDURE — 99999 PR PBB SHADOW E&M-EST. PATIENT-LVL III: CPT | Mod: PBBFAC,,, | Performed by: INTERNAL MEDICINE

## 2022-07-01 PROCEDURE — 99999 PR PBB SHADOW E&M-EST. PATIENT-LVL III: ICD-10-PCS | Mod: PBBFAC,,, | Performed by: INTERNAL MEDICINE

## 2022-07-01 PROCEDURE — 3044F HG A1C LEVEL LT 7.0%: CPT | Mod: CPTII,S$GLB,, | Performed by: INTERNAL MEDICINE

## 2022-07-01 NOTE — PROGRESS NOTES
Jeremiah Medina  was seen as a new patient at the request  Velia Guevara MD for the evaluation of  Pleural effusion.    CHIEF COMPLAINT:  Pleural Effusion      HISTORY OF PRESENT ILLNESS: Jeremiah Medina is a 22 y.o. female  has a past medical history of Anxiety (2012), Asthma, Dyspareunia (2020), Hormone disorder (Spring 2020), Kidney stone, Kidney stone (05/25/2022), Kidney stones, and Pyelonephritis (09/2018).  Patient is a lifelong nonsmoker.  Patient teaches math in middle school.  [facility] flank pain.  cxr mentioned small pleural effusion.  Patient follow up with Dr. Guevara on 5/31/21.  cxr with mild blunting of left costophrenic angle.  Patient was referred to pulmonary for further inputs.    Today, patient denied coughing or wheezing.  Intermittent sharp discomfort (8/10) at inferior base of left breast.  Last 2 minutes and resolved spontaneously.  No back pain.  No fever/chill.  Patient started exercise with walking 1 mile daily without issue.  Intermittent palpitation.  No orthopnea.  No pnd.  No hemoptysis.  +weight gain 50-60 lbs since 2020.     PAST MEDICAL HISTORY:    Active Ambulatory Problems     Diagnosis Date Noted    Kidney stones 09/04/2018    Severe sepsis 09/23/2018    Acute pyelonephritis 09/23/2018    KAMINI (obstructive sleep apnea) 07/01/2022    Pleural effusion 07/01/2022     Resolved Ambulatory Problems     Diagnosis Date Noted    Urolithiasis 09/14/2018    Pyelonephritis 09/24/2018     Past Medical History:   Diagnosis Date    Anxiety 2012    Asthma     Dyspareunia 2020    Hormone disorder Spring 2020    Kidney stone     Kidney stone 05/25/2022                PAST SURGICAL HISTORY:    Past Surgical History:   Procedure Laterality Date    CYSTOSCOPY W/ RETROGRADES Right 9/14/2018    Procedure: CYSTOSCOPY, WITH RETROGRADE PYELOGRAM;  Surgeon: Mian Childs MD;  Location: [facility] OR [address] [place] [address];  Service: Urology;   Laterality: Right;    CYSTOSCOPY W/ URETERAL STENT PLACEMENT Right 9/14/2018    Procedure: CYSTOSCOPY, WITH URETERAL STENT INSERTION;  Surgeon: Mian Childs MD;  Location: [facility] OR [address] [place] [address];  Service: Urology;  Laterality: Right;    URETEROSCOPIC REMOVAL OF URETERIC CALCULUS Right 9/14/2018    Procedure: REMOVAL, CALCULUS, URETER, URETEROSCOPIC;  Surgeon: Mian Childs MD;  Location: [facility] OR [address] [place] [address];  Service: Urology;  Laterality: Right;  1.5 hours    WISDOM TOOTH EXTRACTION           FAMILY HISTORY:                Family History   Problem Relation Age of Onset    Diabetes Maternal Grandmother     Cancer Maternal Grandmother         Colon, non-hodgkins lymphoma    Diabetes Paternal Grandfather     Hypertension Paternal Grandmother        SOCIAL HISTORY:          Tobacco:   Social History     Tobacco Use   Smoking Status Never Smoker   Smokeless Tobacco Never Used     alcohol use:    Social History     Substance and Sexual Activity   Alcohol Use Yes    Alcohol/week: 6.0 - 11.0 standard drinks    Types: 1 Glasses of wine, 5 - 10 Cans of beer per week               Occupation: teacher    ALLERGIES:  Review of patient's allergies indicates:  No Known Allergies    CURRENT MEDICATIONS:    Current Outpatient Medications   Medication Sig Dispense Refill    acetaminophen (TYLENOL) 500 MG tablet Take 500 mg by mouth every 6 (six) hours as needed for Pain.      HYDROcodone-acetaminophen (NORCO) 5-325 mg per tablet Take 1 tablet by mouth every 6 (six) hours as needed.      ibuprofen (ADVIL,MOTRIN) 200 MG tablet Take 200 mg by mouth every 6 (six) hours as needed for Pain.      ondansetron (ZOFRAN-ODT) 4 MG TbDL Take 4 mg by mouth every 8 (eight) hours as needed.      tamsulosin (FLOMAX) 0.4 mg Cap Take 1 capsule by mouth once daily.       No current facility-administered medications for this visit.                  REVIEW OF SYSTEMS:     Pulmonary related symptoms as per HPI.  Gen:  no weight loss, no  "fever, no night sweat  HEENT:  no visual changes, no sore throat, no hearing loss  CV:  Per hpi  GI:  no melena, no hematochezia, no diarhea, no constipation.  :  no dysuria, no hematuria, no hesistancy, no dribbling, recurring nephrolithiasis   Neuro:  no syncope, no vertigo, no tinitus  Psych:  No homocide or suicide ideation; no depression.  Endocrine:  No heat or cold intolerance.  Sleep:  + snoring; no witnessed apnea.  Tire upon awake most days.    Otherwise, a balance of systems reviewed is negative.          PHYSICAL EXAM:  Vitals:    07/01/22 1036   BP: 120/84   Pulse: 76   SpO2: 98%   Weight: 102 kg (224 lb 13.9 oz)   Height: 5' 5" (1.651 m)   PainSc: 0-No pain     Body mass index is 37.42 kg/m².     GENERAL:  well develop; no apparent distress  HEENT:  no nasal congestion; no discharge noted; class 3 modified mallampatti.   NECK:  supple; no palpable masses.  CARDIO: regular rate and rhythm  PULM:  clear to auscultation bilaterally; no intercostals retractions; no accessory muscle usage   ABDOMEN:  soft nontender/nondistended.  +bowel sound  EXTREMITIES no cce  NEURO:  CN II-XII intact.  5/5 motor in all extremities.  sensation grossly intact   to light touch.  PSYCH:  normal affect.  Alert and oriented x 4    LABS  Pulmonary Functions Testing Results(personally reviewed):  none  ABG (personally reviewed):  none  CXR (personally reviewed):  6/1/22 on over effusion on lateral view.  EPA view did not include left costophrenic angle.  CT ABD (personally reviewed):  9/23/18 no effusion or consolidation    ASSESSMENT/PLAN  Problem List Items Addressed This Visit     KAMINI (obstructive sleep apnea)    Overview     The patient symptomatically has loud snoring, restless sleep with findings of elevated bmi. This warrants further investigation for possible obstructive sleep apnea.  Since patient is from [facility], she will follow up with sleep provider their for proximity.             Pleural effusion    " Overview     -minimal blunting noted on cxr 5/30/21.  No significant progression on cxr 6/30.  Clinically asymptomatic along with benign cxr.  No indication for further intervention such as thoracentesis.  Patient reassure.  Advise patient to contact us should she developed worsening of dyspnea or cough.               Other Visit Diagnoses     Bilateral pleural effusion              Patient will No follow-ups on file. with md/np.    CC: Send copy of this note to Velia Guevara MD

## 2022-09-29 ENCOUNTER — PATIENT MESSAGE (OUTPATIENT)
Dept: PEDIATRICS | Facility: CLINIC | Age: 22
End: 2022-09-29
Payer: COMMERCIAL

## 2022-09-30 ENCOUNTER — HOSPITAL ENCOUNTER (OUTPATIENT)
Dept: RADIOLOGY | Facility: HOSPITAL | Age: 22
Discharge: HOME | End: 2022-09-30
Attending: FAMILY MEDICINE
Payer: COMMERCIAL

## 2022-09-30 ENCOUNTER — OFFICE VISIT (OUTPATIENT)
Dept: INTERNAL MEDICINE | Facility: CLINIC | Age: 22
End: 2022-09-30
Payer: COMMERCIAL

## 2022-09-30 VITALS
RESPIRATION RATE: 20 BRPM | HEIGHT: 64 IN | DIASTOLIC BLOOD PRESSURE: 86 MMHG | BODY MASS INDEX: 39.3 KG/M2 | WEIGHT: 230.19 LBS | SYSTOLIC BLOOD PRESSURE: 138 MMHG | HEART RATE: 88 BPM | OXYGEN SATURATION: 98 % | TEMPERATURE: 99 F

## 2022-09-30 DIAGNOSIS — R14.0 ABDOMINAL BLOATING: Primary | ICD-10-CM

## 2022-09-30 PROCEDURE — 74019 RADEX ABDOMEN 2 VIEWS: CPT | Mod: 26,,, | Performed by: RADIOLOGY

## 2022-09-30 PROCEDURE — 3079F DIAST BP 80-89 MM HG: CPT | Mod: CPTII,S$GLB,, | Performed by: FAMILY MEDICINE

## 2022-09-30 PROCEDURE — 3075F PR MOST RECENT SYSTOLIC BLOOD PRESS GE 130-139MM HG: ICD-10-PCS | Mod: CPTII,S$GLB,, | Performed by: FAMILY MEDICINE

## 2022-09-30 PROCEDURE — 3079F PR MOST RECENT DIASTOLIC BLOOD PRESSURE 80-89 MM HG: ICD-10-PCS | Mod: CPTII,S$GLB,, | Performed by: FAMILY MEDICINE

## 2022-09-30 PROCEDURE — 99213 PR OFFICE/OUTPT VISIT, EST, LEVL III, 20-29 MIN: ICD-10-PCS | Mod: S$GLB,,, | Performed by: FAMILY MEDICINE

## 2022-09-30 PROCEDURE — 1159F PR MEDICATION LIST DOCUMENTED IN MEDICAL RECORD: ICD-10-PCS | Mod: CPTII,S$GLB,, | Performed by: FAMILY MEDICINE

## 2022-09-30 PROCEDURE — 99213 OFFICE O/P EST LOW 20 MIN: CPT | Mod: S$GLB,,, | Performed by: FAMILY MEDICINE

## 2022-09-30 PROCEDURE — 74019 RADEX ABDOMEN 2 VIEWS: CPT | Mod: TC,PO

## 2022-09-30 PROCEDURE — 74019 XR ABDOMEN FLAT AND ERECT: ICD-10-PCS | Mod: 26,,, | Performed by: RADIOLOGY

## 2022-09-30 PROCEDURE — 99999 PR PBB SHADOW E&M-EST. PATIENT-LVL V: CPT | Mod: PBBFAC,,, | Performed by: FAMILY MEDICINE

## 2022-09-30 PROCEDURE — 99999 PR PBB SHADOW E&M-EST. PATIENT-LVL V: ICD-10-PCS | Mod: PBBFAC,,, | Performed by: FAMILY MEDICINE

## 2022-09-30 PROCEDURE — 1159F MED LIST DOCD IN RCRD: CPT | Mod: CPTII,S$GLB,, | Performed by: FAMILY MEDICINE

## 2022-09-30 PROCEDURE — 3008F BODY MASS INDEX DOCD: CPT | Mod: CPTII,S$GLB,, | Performed by: FAMILY MEDICINE

## 2022-09-30 PROCEDURE — 3044F HG A1C LEVEL LT 7.0%: CPT | Mod: CPTII,S$GLB,, | Performed by: FAMILY MEDICINE

## 2022-09-30 PROCEDURE — 3044F PR MOST RECENT HEMOGLOBIN A1C LEVEL <7.0%: ICD-10-PCS | Mod: CPTII,S$GLB,, | Performed by: FAMILY MEDICINE

## 2022-09-30 PROCEDURE — 3008F PR BODY MASS INDEX (BMI) DOCUMENTED: ICD-10-PCS | Mod: CPTII,S$GLB,, | Performed by: FAMILY MEDICINE

## 2022-09-30 PROCEDURE — 3075F SYST BP GE 130 - 139MM HG: CPT | Mod: CPTII,S$GLB,, | Performed by: FAMILY MEDICINE

## 2022-09-30 RX ORDER — ERGOCALCIFEROL 1.25 MG/1
50000 CAPSULE ORAL
COMMUNITY
End: 2024-01-25

## 2022-10-01 NOTE — PROGRESS NOTES
Subjective:      Patient ID: Jeremiah Medina is a 22 y.o. female.    Chief Complaint: Abdominal Pain and Nausea      Patient reports pain in upper abdomen, headache, nausesa, small amounts of diarrhea at a time.     Abdominal Pain  This is a new problem. The current episode started in the past 7 days. The onset quality is gradual. The problem occurs daily. The most recent episode lasted 3 Hours. The problem has been gradually worsening. The pain is located in the epigastric region and suprapubic region. The pain is at a severity of 6/10. The pain is moderate. The quality of the pain is aching, a sensation of fullness and sharp. Associated symptoms include anorexia, arthralgias, belching, constipation, diarrhea, flatus, headaches, myalgias and nausea. Pertinent negatives include no dysuria, fever, frequency, hematochezia, hematuria, melena, vomiting or weight loss. The pain is aggravated by bowel movement, eating and movement. The pain is relieved by Being still and recumbency. She has tried acetaminophen and antacids for the symptoms. The treatment provided mild relief. There is no history of abdominal surgery, colon cancer, Crohn's disease, gallstones, GERD, irritable bowel syndrome, pancreatitis, PUD or ulcerative colitis. Patient's medical history includes kidney stones and UTI.   Nausea  Associated symptoms include abdominal pain, anorexia, arthralgias, headaches, myalgias and nausea. Pertinent negatives include no fever or vomiting.   Review of Systems   Constitutional:  Negative for fever and weight loss.   Gastrointestinal:  Positive for abdominal pain, anorexia, constipation, diarrhea, flatus and nausea. Negative for hematochezia, melena and vomiting.   Genitourinary:  Negative for dysuria, frequency and hematuria.   Musculoskeletal:  Positive for arthralgias and myalgias.   Neurological:  Positive for headaches.   Past Medical History:   Diagnosis Date    Anxiety 2012    Used to take medication for  "anxiety. Dont take it anymore but still feel anxious from time to time.    Asthma     exercise induced    Dyspareunia 2020    Not as bad now but still painful sometimes.    Hormone disorder Spring 2020    Kidney stone     Kidney stone 05/25/2022    Kidney stones     Pyelonephritis 09/2018          Past Surgical History:   Procedure Laterality Date    CYSTOSCOPY W/ RETROGRADES Right 9/14/2018    Procedure: CYSTOSCOPY, WITH RETROGRADE PYELOGRAM;  Surgeon: Mian Childs MD;  Location: [facility] OR [address] [place] [address];  Service: Urology;  Laterality: Right;    CYSTOSCOPY W/ URETERAL STENT PLACEMENT Right 9/14/2018    Procedure: CYSTOSCOPY, WITH URETERAL STENT INSERTION;  Surgeon: Mian Childs MD;  Location: [facility] OR [address] [place] [address];  Service: Urology;  Laterality: Right;    URETEROSCOPIC REMOVAL OF URETERIC CALCULUS Right 9/14/2018    Procedure: REMOVAL, CALCULUS, URETER, URETEROSCOPIC;  Surgeon: Mian Childs MD;  Location: [facility] OR [address] [place] [address];  Service: Urology;  Laterality: Right;  1.5 hours    WISDOM TOOTH EXTRACTION       Family History   Problem Relation Age of Onset    Diabetes Maternal Grandmother     Cancer Maternal Grandmother         Colon, non-hodgkins lymphoma    Diabetes Paternal Grandfather     Hypertension Paternal Grandmother      Social History     Socioeconomic History    Marital status: Single   Tobacco Use    Smoking status: Never    Smokeless tobacco: Never   Substance and Sexual Activity    Alcohol use: Yes     Alcohol/week: 6.0 - 11.0 standard drinks     Types: 1 Glasses of wine, 5 - 10 Cans of beer per week    Drug use: No    Sexual activity: Yes     Partners: Male     Birth control/protection: Condom     Review of patient's allergies indicates:  No Known Allergies    Objective:       /86 (BP Location: Right arm, Patient Position: Sitting, BP Method: Large (Manual))   Pulse 88   Temp 98.7 °F (37.1 °C) (Tympanic)   Resp 20   Ht 5' 4.25" (1.632 m)   Wt 104.4 kg (230 lb 2.6 oz)   SpO2 98%   BMI " 39.20 kg/m²   Physical Exam  Constitutional:       General: She is not in acute distress.     Appearance: Normal appearance. She is well-developed. She is not ill-appearing or diaphoretic.   Cardiovascular:      Rate and Rhythm: Normal rate and regular rhythm.      Heart sounds: Normal heart sounds.   Pulmonary:      Effort: Pulmonary effort is normal.      Breath sounds: Normal breath sounds.   Abdominal:      General: Bowel sounds are normal.      Tenderness: There is abdominal tenderness (mild, upper abdome). There is no guarding or rebound.   Neurological:      Mental Status: She is alert and oriented to person, place, and time.   Psychiatric:         Mood and Affect: Mood normal.         Behavior: Behavior normal.         Thought Content: Thought content normal.         Judgment: Judgment normal.     Assessment:     1. Abdominal bloating      Plan:   Abdominal bloating  -     X-Ray Abdomen Flat And Erect; Future; Expected date: 09/30/2022    Xray shows significant stool and gas in ascending and transverse colon  Recommended increased fluids, miralax  Medication List with Changes/Refills   Current Medications    ACETAMINOPHEN (TYLENOL) 500 MG TABLET    Take 500 mg by mouth every 6 (six) hours as needed for Pain.    ERGOCALCIFEROL (ERGOCALCIFEROL) 50,000 UNIT CAP    Take 50,000 Units by mouth every 7 days.    IBUPROFEN (ADVIL,MOTRIN) 200 MG TABLET    Take 200 mg by mouth every 6 (six) hours as needed for Pain.    ONDANSETRON (ZOFRAN-ODT) 4 MG TBDL    Take 4 mg by mouth every 8 (eight) hours as needed.    TAMSULOSIN (FLOMAX) 0.4 MG CAP    Take 1 capsule by mouth once daily.   Discontinued Medications    HYDROCODONE-ACETAMINOPHEN (NORCO) 5-325 MG PER TABLET    Take 1 tablet by mouth every 6 (six) hours as needed.

## 2024-01-17 ENCOUNTER — TELEPHONE (OUTPATIENT)
Dept: INTERNAL MEDICINE | Facility: CLINIC | Age: 24
End: 2024-01-17
Payer: COMMERCIAL

## 2024-01-17 NOTE — TELEPHONE ENCOUNTER
No we do not have any involvement of care. Mom was going to try and get her records from the hospital.  I did tell her you didn't have any privileges at [facility].  I think she was just asking if you could give any suggestions or thoughts about her medical care or treatment while hospitalized.

## 2024-01-17 NOTE — TELEPHONE ENCOUNTER
----- Message from Radha Argueta sent at 1/17/2024  1:55 PM CST -----  Name of Who is Calling:mother           What is the request in detail:mother is requesting to speak with office due to patients admission into the hospital for being septic.       Hospital: [id number]    Can the clinic reply by [facility]:no           What Number to Call Back if not in [facility]: [phone]

## 2024-01-17 NOTE — TELEPHONE ENCOUNTER
Patient mom called to inform that patient was hospitalized for sepsis.  She had blood work, Ct scan looking at possible kidney stones.  She wanted to get some input with you on what you think. I informed I would let you know but asked if she could get medical records from [facility]. ( Patient still hospitalized).

## 2024-01-25 ENCOUNTER — OFFICE VISIT (OUTPATIENT)
Dept: INTERNAL MEDICINE | Facility: CLINIC | Age: 24
End: 2024-01-25
Payer: COMMERCIAL

## 2024-01-25 VITALS
DIASTOLIC BLOOD PRESSURE: 84 MMHG | SYSTOLIC BLOOD PRESSURE: 122 MMHG | HEIGHT: 64 IN | TEMPERATURE: 97 F | OXYGEN SATURATION: 98 % | WEIGHT: 222.69 LBS | HEART RATE: 102 BPM | BODY MASS INDEX: 38.02 KG/M2

## 2024-01-25 DIAGNOSIS — Z09 HOSPITAL DISCHARGE FOLLOW-UP: Primary | ICD-10-CM

## 2024-01-25 DIAGNOSIS — A41.9 SYSTEMIC INFECTION: ICD-10-CM

## 2024-01-25 DIAGNOSIS — R10.9 RIGHT FLANK PAIN: ICD-10-CM

## 2024-01-25 PROCEDURE — 3079F DIAST BP 80-89 MM HG: CPT | Mod: CPTII,S$GLB,, | Performed by: FAMILY MEDICINE

## 2024-01-25 PROCEDURE — 1159F MED LIST DOCD IN RCRD: CPT | Mod: CPTII,S$GLB,, | Performed by: FAMILY MEDICINE

## 2024-01-25 PROCEDURE — 99213 OFFICE O/P EST LOW 20 MIN: CPT | Mod: S$GLB,,, | Performed by: FAMILY MEDICINE

## 2024-01-25 PROCEDURE — 99999 PR PBB SHADOW E&M-EST. PATIENT-LVL IV: CPT | Mod: PBBFAC,,, | Performed by: FAMILY MEDICINE

## 2024-01-25 PROCEDURE — 3074F SYST BP LT 130 MM HG: CPT | Mod: CPTII,S$GLB,, | Performed by: FAMILY MEDICINE

## 2024-01-25 RX ORDER — BUTALBITAL, ACETAMINOPHEN AND CAFFEINE 50; 325; 40 MG/1; MG/1; MG/1
1 TABLET ORAL
COMMUNITY

## 2024-01-25 RX ORDER — LEVOFLOXACIN 750 MG/1
750 TABLET ORAL DAILY
COMMUNITY
End: 2024-02-02

## 2024-01-26 ENCOUNTER — LAB VISIT (OUTPATIENT)
Dept: LAB | Facility: HOSPITAL | Age: 24
End: 2024-01-26
Attending: FAMILY MEDICINE
Payer: COMMERCIAL

## 2024-01-26 DIAGNOSIS — Z09 HOSPITAL DISCHARGE FOLLOW-UP: ICD-10-CM

## 2024-01-26 LAB
ALBUMIN SERPL BCP-MCNC: 3.5 G/DL (ref 3.5–5.2)
ALP SERPL-CCNC: 59 U/L (ref 55–135)
ALT SERPL W/O P-5'-P-CCNC: 21 U/L (ref 10–44)
ANION GAP SERPL CALC-SCNC: 11 MMOL/L (ref 8–16)
AST SERPL-CCNC: 19 U/L (ref 10–40)
BASOPHILS # BLD AUTO: 0.06 K/UL (ref 0–0.2)
BASOPHILS NFR BLD: 0.8 % (ref 0–1.9)
BILIRUB SERPL-MCNC: 0.3 MG/DL (ref 0.1–1)
BUN SERPL-MCNC: 14 MG/DL (ref 6–20)
CALCIUM SERPL-MCNC: 10.1 MG/DL (ref 8.7–10.5)
CHLORIDE SERPL-SCNC: 103 MMOL/L (ref 95–110)
CO2 SERPL-SCNC: 21 MMOL/L (ref 23–29)
CREAT SERPL-MCNC: 0.7 MG/DL (ref 0.5–1.4)
DIFFERENTIAL METHOD BLD: ABNORMAL
EOSINOPHIL # BLD AUTO: 0.2 K/UL (ref 0–0.5)
EOSINOPHIL NFR BLD: 1.9 % (ref 0–8)
ERYTHROCYTE [DISTWIDTH] IN BLOOD BY AUTOMATED COUNT: 13.2 % (ref 11.5–14.5)
EST. GFR  (NO RACE VARIABLE): >60 ML/MIN/1.73 M^2
GLUCOSE SERPL-MCNC: 89 MG/DL (ref 70–110)
HCT VFR BLD AUTO: 40.6 % (ref 37–48.5)
HGB BLD-MCNC: 13 G/DL (ref 12–16)
IMM GRANULOCYTES # BLD AUTO: 0.03 K/UL (ref 0–0.04)
IMM GRANULOCYTES NFR BLD AUTO: 0.4 % (ref 0–0.5)
LYMPHOCYTES # BLD AUTO: 2.4 K/UL (ref 1–4.8)
LYMPHOCYTES NFR BLD: 30.8 % (ref 18–48)
MCH RBC QN AUTO: 29.3 PG (ref 27–31)
MCHC RBC AUTO-ENTMCNC: 32 G/DL (ref 32–36)
MCV RBC AUTO: 91 FL (ref 82–98)
MONOCYTES # BLD AUTO: 0.5 K/UL (ref 0.3–1)
MONOCYTES NFR BLD: 6.1 % (ref 4–15)
NEUTROPHILS # BLD AUTO: 4.8 K/UL (ref 1.8–7.7)
NEUTROPHILS NFR BLD: 60 % (ref 38–73)
NRBC BLD-RTO: 0 /100 WBC
PLATELET # BLD AUTO: 457 K/UL (ref 150–450)
PMV BLD AUTO: 10.7 FL (ref 9.2–12.9)
POTASSIUM SERPL-SCNC: 4.4 MMOL/L (ref 3.5–5.1)
PROT SERPL-MCNC: 7.8 G/DL (ref 6–8.4)
RBC # BLD AUTO: 4.44 M/UL (ref 4–5.4)
SODIUM SERPL-SCNC: 135 MMOL/L (ref 136–145)
WBC # BLD AUTO: 7.91 K/UL (ref 3.9–12.7)

## 2024-01-26 PROCEDURE — 36415 COLL VENOUS BLD VENIPUNCTURE: CPT | Mod: PO | Performed by: FAMILY MEDICINE

## 2024-01-26 PROCEDURE — 80053 COMPREHEN METABOLIC PANEL: CPT | Performed by: FAMILY MEDICINE

## 2024-01-26 PROCEDURE — 85025 COMPLETE CBC W/AUTO DIFF WBC: CPT | Performed by: FAMILY MEDICINE

## 2024-01-27 NOTE — PROGRESS NOTES
Subjective:      Patient ID: Jeremiah Medina is a 23 y.o. female.    Chief Complaint: Hospital Follow Up      Patient here for hospital discharge follow up. She was inpatient at [facility] from January 1/16 - 20th , diagnosed with sepsis.   Patient reports she has completed antibiotic levaquin. She is feeling better, afebrile since she returned home.      Review of Systems   Constitutional:  Positive for fatigue. Negative for activity change, appetite change and fever.   Respiratory:  Negative for shortness of breath.    Cardiovascular:  Negative for chest pain.   Gastrointestinal:  Negative for abdominal pain.     Past Medical History:   Diagnosis Date    Abnormal uterine bleeding October 2022    Bleeding during sex not close to period. Other random bleeding throughout the month.    Anxiety 2012    Used to take medication for anxiety. Dont take it anymore but still feel anxious from time to time.    Asthma     exercise induced    Clotting disorder Fall 2022    Random spotting throughout month.    Dyspareunia 2020    Not as bad now but still painful sometimes.    Hormone disorder Spring 2020    Kidney stone 05/25/2022    Pyelonephritis 09/2018          Past Surgical History:   Procedure Laterality Date    CYSTOSCOPY W/ RETROGRADES Right 9/14/2018    Procedure: CYSTOSCOPY, WITH RETROGRADE PYELOGRAM;  Surgeon: Mian Childs MD;  Location: [address];  Service: Urology;  Laterality: Right;    CYSTOSCOPY W/ URETERAL STENT PLACEMENT Right 9/14/2018    Procedure: CYSTOSCOPY, WITH URETERAL STENT INSERTION;  Surgeon: Mian Childs MD;  Location: [facility] OR [address] [place] [address];  Service: Urology;  Laterality: Right;    URETEROSCOPIC REMOVAL OF URETERIC CALCULUS Right 9/14/2018    Procedure: REMOVAL, CALCULUS, URETER, URETEROSCOPIC;  Surgeon: Mian Childs MD;  Location: [facility] OR [address] [place] [address];  Service: Urology;  Laterality: Right;  1.5 hours    WISDOM TOOTH EXTRACTION       Family History   Problem Relation Age of  "Onset    Diabetes Maternal Grandmother     Cancer Maternal Grandmother         Colon, non-hodgkins lymphoma    Diabetes Paternal Grandfather     Hypertension Paternal Grandmother      Social History     Socioeconomic History    Marital status: Single   Tobacco Use    Smoking status: Never    Smokeless tobacco: Never   Substance and Sexual Activity    Alcohol use: Yes     Alcohol/week: 0.0 - 8.0 standard drinks of alcohol    Drug use: No    Sexual activity: Yes     Partners: Male     Birth control/protection: Condom, None     Review of patient's allergies indicates:  No Known Allergies    Objective:       /84 (BP Location: Right arm, Patient Position: Sitting, BP Method: Large (Manual))   Pulse 102   Temp 97.4 °F (36.3 °C) (Tympanic)   Ht 5' 4" (1.626 m)   Wt 101 kg (222 lb 10.6 oz)   LMP 12/16/2023   SpO2 98%   BMI 38.22 kg/m²   Physical Exam  Constitutional:       General: She is not in acute distress.     Appearance: Normal appearance. She is well-developed. She is not ill-appearing or diaphoretic.   Cardiovascular:      Rate and Rhythm: Normal rate and regular rhythm.      Heart sounds: Normal heart sounds.   Pulmonary:      Effort: Pulmonary effort is normal.      Breath sounds: Normal breath sounds.   Neurological:      Mental Status: She is alert and oriented to person, place, and time.   Psychiatric:         Mood and Affect: Mood normal.         Behavior: Behavior normal.         Thought Content: Thought content normal.         Judgment: Judgment normal.       Assessment:     1. Hospital discharge follow-up    2. Systemic infection    3. Right flank pain      Plan:   Hospital discharge follow-up  -     CBC Auto Differential; Future; Expected date: 01/25/2024  -     Comprehensive Metabolic Panel; Future; Expected date: 07/23/2024    Systemic infection    Right flank pain    Continue current medications  Will have above labs drawn later this week when available  Medication List with Changes/Refills "   Current Medications    ACETAMINOPHEN (TYLENOL) 500 MG TABLET    Take 500 mg by mouth every 6 (six) hours as needed for Pain.    BUTALBITAL-ACETAMINOPHEN-CAFFEINE -40 MG (FIORICET, ESGIC) -40 MG PER TABLET    1 tablet.    DROSPIRENONE-ETHINYL ESTRADIOL (CARON) 3-0.02 MG PER TABLET    Take 1 tablet by mouth once daily.    IBUPROFEN (ADVIL,MOTRIN) 200 MG TABLET    Take 200 mg by mouth every 6 (six) hours as needed for Pain.    KETOCONAZOLE (NIZORAL) 2 % SHAMPOO    Apply topically twice a week.    LEVOFLOXACIN (LEVAQUIN) 750 MG TABLET    Take 750 mg by mouth once daily.   Discontinued Medications    ERGOCALCIFEROL (ERGOCALCIFEROL) 50,000 UNIT CAP    Take 50,000 Units by mouth every 7 days.    NYSTATIN-TRIAMCINOLONE (MYCOLOG II) CREAM    Apply topically 2 (two) times daily.    ONDANSETRON (ZOFRAN-ODT) 4 MG TBDL    Take 4 mg by mouth every 8 (eight) hours as needed.    TAMSULOSIN (FLOMAX) 0.4 MG CAP    Take 1 capsule by mouth once daily.

## 2024-02-01 ENCOUNTER — PATIENT MESSAGE (OUTPATIENT)
Dept: INTERNAL MEDICINE | Facility: CLINIC | Age: 24
End: 2024-02-01
Payer: COMMERCIAL

## 2024-02-02 ENCOUNTER — TELEPHONE (OUTPATIENT)
Dept: INTERNAL MEDICINE | Facility: CLINIC | Age: 24
End: 2024-02-02
Payer: COMMERCIAL

## 2024-02-02 ENCOUNTER — OFFICE VISIT (OUTPATIENT)
Dept: INTERNAL MEDICINE | Facility: CLINIC | Age: 24
End: 2024-02-02
Payer: COMMERCIAL

## 2024-02-02 VITALS
DIASTOLIC BLOOD PRESSURE: 82 MMHG | SYSTOLIC BLOOD PRESSURE: 132 MMHG | OXYGEN SATURATION: 97 % | HEIGHT: 64 IN | TEMPERATURE: 99 F | HEART RATE: 94 BPM | WEIGHT: 223.13 LBS | BODY MASS INDEX: 38.09 KG/M2

## 2024-02-02 DIAGNOSIS — R23.2 FACIAL FLUSHING: ICD-10-CM

## 2024-02-02 DIAGNOSIS — R03.0 ELEVATED BLOOD PRESSURE READING: Primary | ICD-10-CM

## 2024-02-02 PROCEDURE — 3008F BODY MASS INDEX DOCD: CPT | Mod: CPTII,S$GLB,, | Performed by: FAMILY MEDICINE

## 2024-02-02 PROCEDURE — 99213 OFFICE O/P EST LOW 20 MIN: CPT | Mod: S$GLB,,, | Performed by: FAMILY MEDICINE

## 2024-02-02 PROCEDURE — 1159F MED LIST DOCD IN RCRD: CPT | Mod: CPTII,S$GLB,, | Performed by: FAMILY MEDICINE

## 2024-02-02 PROCEDURE — 99999 PR PBB SHADOW E&M-EST. PATIENT-LVL III: CPT | Mod: PBBFAC,,, | Performed by: FAMILY MEDICINE

## 2024-02-02 PROCEDURE — 3079F DIAST BP 80-89 MM HG: CPT | Mod: CPTII,S$GLB,, | Performed by: FAMILY MEDICINE

## 2024-02-02 PROCEDURE — 3075F SYST BP GE 130 - 139MM HG: CPT | Mod: CPTII,S$GLB,, | Performed by: FAMILY MEDICINE

## 2024-02-02 NOTE — TELEPHONE ENCOUNTER
----- Message from Kathleen Guillermo sent at 2/2/2024  3:10 PM CST -----  Contact: self  Patient is requesting a call back regarding same day appointment. Patient stated she just received a call that she can be seen at 4pm. Please call back @ [phone] (home)

## 2024-02-03 NOTE — PROGRESS NOTES
Subjective:      Patient ID: Jeremiah Medina is a 23 y.o. female.    Chief Complaint: Hypertension      Patient reports yesterday at work became very flushed, face felt slightly swollen.  Went home and checked her blood pressure several times last night, was elevated with readings of 141/102, 153/103, 137/92.  Flushing did seem to gradually improve over the evening, this morning seemed to be resolved, blood pressure well controlled today    Hypertension  Pertinent negatives include no chest pain, palpitations or shortness of breath.     Review of Systems   Respiratory:  Negative for shortness of breath.    Cardiovascular:  Negative for chest pain, palpitations and leg swelling.   Skin:  Positive for color change.     Past Medical History:   Diagnosis Date    Abnormal uterine bleeding October 2022    Bleeding during sex not close to period. Other random bleeding throughout the month.    Anxiety 2012    Used to take medication for anxiety. Dont take it anymore but still feel anxious from time to time.    Asthma     exercise induced    Clotting disorder Fall 2022    Random spotting throughout month.    Dyspareunia 2020    Not as bad now but still painful sometimes.    Hormone disorder Spring 2020    Kidney stone 05/25/2022    Pyelonephritis 09/2018          Past Surgical History:   Procedure Laterality Date    CYSTOSCOPY W/ RETROGRADES Right 9/14/2018    Procedure: CYSTOSCOPY, WITH RETROGRADE PYELOGRAM;  Surgeon: Mian Childs MD;  Location: [facility] OR [address] [place] [address];  Service: Urology;  Laterality: Right;    CYSTOSCOPY W/ URETERAL STENT PLACEMENT Right 9/14/2018    Procedure: CYSTOSCOPY, WITH URETERAL STENT INSERTION;  Surgeon: Mian Childs MD;  Location: [facility] OR [address] [place] [address];  Service: Urology;  Laterality: Right;    URETEROSCOPIC REMOVAL OF URETERIC CALCULUS Right 9/14/2018    Procedure: REMOVAL, CALCULUS, URETER, URETEROSCOPIC;  Surgeon: Mian Childs MD;  Location: [facility] OR [address] [place] [address];  Service: Urology;   "Laterality: Right;  1.5 hours    WISDOM TOOTH EXTRACTION       Family History   Problem Relation Age of Onset    Diabetes Maternal Grandmother     Cancer Maternal Grandmother         Colon, non-hodgkins lymphoma    Diabetes Paternal Grandfather     Hypertension Paternal Grandmother      Social History     Socioeconomic History    Marital status: Single   Tobacco Use    Smoking status: Never    Smokeless tobacco: Never   Substance and Sexual Activity    Alcohol use: Yes     Alcohol/week: 0.0 - 8.0 standard drinks of alcohol    Drug use: No    Sexual activity: Yes     Partners: Male     Birth control/protection: Condom, None     Review of patient's allergies indicates:  No Known Allergies    Objective:       /82 (BP Location: Right arm, Patient Position: Sitting, BP Method: Large (Manual))   Pulse 94   Temp 98.8 °F (37.1 °C) (Tympanic)   Ht 5' 4" (1.626 m)   Wt 101.2 kg (223 lb 1.7 oz)   LMP 12/16/2023   SpO2 97%   BMI 38.30 kg/m²   Physical Exam  Constitutional:       General: She is not in acute distress.     Appearance: Normal appearance. She is well-developed. She is not ill-appearing or diaphoretic.   Cardiovascular:      Rate and Rhythm: Normal rate and regular rhythm.      Heart sounds: Normal heart sounds.   Pulmonary:      Effort: Pulmonary effort is normal.      Breath sounds: Normal breath sounds.   Neurological:      Mental Status: She is alert and oriented to person, place, and time.   Psychiatric:         Mood and Affect: Mood normal.         Behavior: Behavior normal.         Thought Content: Thought content normal.         Judgment: Judgment normal.       Assessment:     1. Elevated blood pressure reading    2. Facial flushing      Plan:   Elevated blood pressure reading    Facial flushing    Patient will monitor her blood pressure readings at home over the next few weeks.  If flushing recurs will take a picture and send to me.  Medication List with Changes/Refills   Current Medications    " ACETAMINOPHEN (TYLENOL) 500 MG TABLET    Take 500 mg by mouth every 6 (six) hours as needed for Pain.    BUTALBITAL-ACETAMINOPHEN-CAFFEINE -40 MG (FIORICET, ESGIC) -40 MG PER TABLET    1 tablet.    DROSPIRENONE-ETHINYL ESTRADIOL (CARON) 3-0.02 MG PER TABLET    Take 1 tablet by mouth once daily.    IBUPROFEN (ADVIL,MOTRIN) 200 MG TABLET    Take 200 mg by mouth every 6 (six) hours as needed for Pain.    KETOCONAZOLE (NIZORAL) 2 % SHAMPOO    Apply topically twice a week.   Discontinued Medications    LEVOFLOXACIN (LEVAQUIN) 750 MG TABLET    Take 750 mg by mouth once daily.

## 2025-06-13 ENCOUNTER — PATIENT OUTREACH (OUTPATIENT)
Dept: ADMINISTRATIVE | Facility: HOSPITAL | Age: 25
End: 2025-06-13
Payer: COMMERCIAL

## 2025-07-28 ENCOUNTER — OFFICE VISIT (OUTPATIENT)
Dept: INTERNAL MEDICINE | Facility: CLINIC | Age: 25
End: 2025-07-28
Payer: COMMERCIAL

## 2025-07-28 ENCOUNTER — LAB VISIT (OUTPATIENT)
Dept: LAB | Facility: HOSPITAL | Age: 25
End: 2025-07-28
Attending: PHYSICIAN ASSISTANT
Payer: COMMERCIAL

## 2025-07-28 VITALS
OXYGEN SATURATION: 99 % | HEART RATE: 82 BPM | BODY MASS INDEX: 39.94 KG/M2 | DIASTOLIC BLOOD PRESSURE: 72 MMHG | WEIGHT: 233.94 LBS | RESPIRATION RATE: 18 BRPM | SYSTOLIC BLOOD PRESSURE: 118 MMHG | HEIGHT: 64 IN | TEMPERATURE: 97 F

## 2025-07-28 DIAGNOSIS — E66.813 CLASS 3 SEVERE OBESITY DUE TO EXCESS CALORIES WITHOUT SERIOUS COMORBIDITY WITH BODY MASS INDEX (BMI) OF 40.0 TO 44.9 IN ADULT: ICD-10-CM

## 2025-07-28 DIAGNOSIS — Z00.00 ROUTINE GENERAL MEDICAL EXAMINATION AT A HEALTH CARE FACILITY: ICD-10-CM

## 2025-07-28 DIAGNOSIS — Z00.00 ROUTINE GENERAL MEDICAL EXAMINATION AT A HEALTH CARE FACILITY: Primary | ICD-10-CM

## 2025-07-28 PROBLEM — J90 PLEURAL EFFUSION: Status: RESOLVED | Noted: 2022-07-01 | Resolved: 2025-07-28

## 2025-07-28 PROBLEM — N10 ACUTE PYELONEPHRITIS: Status: RESOLVED | Noted: 2018-09-23 | Resolved: 2025-07-28

## 2025-07-28 PROBLEM — Z86.19 HISTORY OF SEPSIS: Status: ACTIVE | Noted: 2018-09-23

## 2025-07-28 LAB
ABSOLUTE EOSINOPHIL (OHS): 0.16 K/UL
ABSOLUTE MONOCYTE (OHS): 0.52 K/UL (ref 0.3–1)
ABSOLUTE NEUTROPHIL COUNT (OHS): 3.89 K/UL (ref 1.8–7.7)
ALBUMIN SERPL BCP-MCNC: 4 G/DL (ref 3.5–5.2)
ALP SERPL-CCNC: 63 UNIT/L (ref 40–150)
ALT SERPL W/O P-5'-P-CCNC: 19 UNIT/L (ref 0–55)
ANION GAP (OHS): 6 MMOL/L (ref 8–16)
AST SERPL-CCNC: 19 UNIT/L (ref 0–50)
BASOPHILS # BLD AUTO: 0.04 K/UL
BASOPHILS NFR BLD AUTO: 0.6 %
BILIRUB SERPL-MCNC: 0.4 MG/DL (ref 0.1–1)
BUN SERPL-MCNC: 14 MG/DL (ref 6–20)
CALCIUM SERPL-MCNC: 8.6 MG/DL (ref 8.7–10.5)
CHLORIDE SERPL-SCNC: 107 MMOL/L (ref 95–110)
CHOLEST SERPL-MCNC: 170 MG/DL (ref 120–199)
CHOLEST/HDLC SERPL: 3 {RATIO} (ref 2–5)
CO2 SERPL-SCNC: 23 MMOL/L (ref 23–29)
CREAT SERPL-MCNC: 0.7 MG/DL (ref 0.5–1.4)
EAG (OHS): 100 MG/DL (ref 68–131)
ERYTHROCYTE [DISTWIDTH] IN BLOOD BY AUTOMATED COUNT: 13.1 % (ref 11.5–14.5)
GFR SERPLBLD CREATININE-BSD FMLA CKD-EPI: >60 ML/MIN/1.73/M2
GLUCOSE SERPL-MCNC: 82 MG/DL (ref 70–110)
HBA1C MFR BLD: 5.1 % (ref 4–5.6)
HCT VFR BLD AUTO: 41.5 % (ref 37–48.5)
HDLC SERPL-MCNC: 57 MG/DL (ref 40–75)
HDLC SERPL: 33.5 % (ref 20–50)
HGB BLD-MCNC: 13.8 GM/DL (ref 12–16)
IMM GRANULOCYTES # BLD AUTO: 0.01 K/UL (ref 0–0.04)
IMM GRANULOCYTES NFR BLD AUTO: 0.2 % (ref 0–0.5)
LDLC SERPL CALC-MCNC: 101.8 MG/DL (ref 63–159)
LYMPHOCYTES # BLD AUTO: 1.72 K/UL (ref 1–4.8)
MCH RBC QN AUTO: 29.9 PG (ref 27–31)
MCHC RBC AUTO-ENTMCNC: 33.3 G/DL (ref 32–36)
MCV RBC AUTO: 90 FL (ref 82–98)
NONHDLC SERPL-MCNC: 113 MG/DL
NUCLEATED RBC (/100WBC) (OHS): 0 /100 WBC
PLATELET # BLD AUTO: 274 K/UL (ref 150–450)
PMV BLD AUTO: 11.2 FL (ref 9.2–12.9)
POTASSIUM SERPL-SCNC: 4.4 MMOL/L (ref 3.5–5.1)
PROT SERPL-MCNC: 6.8 GM/DL (ref 6–8.4)
RBC # BLD AUTO: 4.62 M/UL (ref 4–5.4)
RELATIVE EOSINOPHIL (OHS): 2.5 %
RELATIVE LYMPHOCYTE (OHS): 27.1 % (ref 18–48)
RELATIVE MONOCYTE (OHS): 8.2 % (ref 4–15)
RELATIVE NEUTROPHIL (OHS): 61.4 % (ref 38–73)
SODIUM SERPL-SCNC: 136 MMOL/L (ref 136–145)
TRIGL SERPL-MCNC: 56 MG/DL (ref 30–150)
TSH SERPL-ACNC: 3.44 UIU/ML (ref 0.4–4)
WBC # BLD AUTO: 6.34 K/UL (ref 3.9–12.7)

## 2025-07-28 PROCEDURE — 36415 COLL VENOUS BLD VENIPUNCTURE: CPT | Mod: PO

## 2025-07-28 PROCEDURE — 84443 ASSAY THYROID STIM HORMONE: CPT

## 2025-07-28 PROCEDURE — 99395 PREV VISIT EST AGE 18-39: CPT | Mod: S$GLB,,, | Performed by: PHYSICIAN ASSISTANT

## 2025-07-28 PROCEDURE — 80061 LIPID PANEL: CPT

## 2025-07-28 PROCEDURE — 1160F RVW MEDS BY RX/DR IN RCRD: CPT | Mod: CPTII,S$GLB,, | Performed by: PHYSICIAN ASSISTANT

## 2025-07-28 PROCEDURE — 1159F MED LIST DOCD IN RCRD: CPT | Mod: CPTII,S$GLB,, | Performed by: PHYSICIAN ASSISTANT

## 2025-07-28 PROCEDURE — 3078F DIAST BP <80 MM HG: CPT | Mod: CPTII,S$GLB,, | Performed by: PHYSICIAN ASSISTANT

## 2025-07-28 PROCEDURE — 3008F BODY MASS INDEX DOCD: CPT | Mod: CPTII,S$GLB,, | Performed by: PHYSICIAN ASSISTANT

## 2025-07-28 PROCEDURE — 80053 COMPREHEN METABOLIC PANEL: CPT

## 2025-07-28 PROCEDURE — 3074F SYST BP LT 130 MM HG: CPT | Mod: CPTII,S$GLB,, | Performed by: PHYSICIAN ASSISTANT

## 2025-07-28 PROCEDURE — 85025 COMPLETE CBC W/AUTO DIFF WBC: CPT

## 2025-07-28 PROCEDURE — 99999 PR PBB SHADOW E&M-EST. PATIENT-LVL III: CPT | Mod: PBBFAC,,, | Performed by: PHYSICIAN ASSISTANT

## 2025-07-28 PROCEDURE — 83036 HEMOGLOBIN GLYCOSYLATED A1C: CPT

## 2025-07-28 NOTE — PROGRESS NOTES
"Subjective:       Patient ID: Jeremiah Medina is a 25 y.o. female.    Chief Complaint: Annual Exam      HPI:  Patient presents to clinic today for annual physical exam.    Review of Systems   Constitutional:  Negative for chills, fatigue, fever and unexpected weight change.   HENT:  Negative for congestion, dental problem, ear pain, hearing loss, rhinorrhea and trouble swallowing.    Eyes:  Negative for pain and visual disturbance.   Respiratory:  Negative for cough and shortness of breath.    Cardiovascular:  Negative for chest pain, palpitations and leg swelling.   Gastrointestinal:  Negative for abdominal distention, abdominal pain, blood in stool, constipation, diarrhea, nausea and vomiting.   Genitourinary:  Negative for difficulty urinating and vaginal discharge.   Musculoskeletal:  Negative for arthralgias and myalgias.   Skin:  Negative for rash.   Neurological:  Negative for dizziness, weakness, numbness and headaches.   Hematological:  Negative for adenopathy. Does not bruise/bleed easily.   Psychiatric/Behavioral:  Negative for dysphoric mood and sleep disturbance. The patient is not nervous/anxious.          Objective:     Vitals:    07/28/25 0823   BP: 118/72   BP Location: Left arm   Patient Position: Sitting   Pulse: 82   Resp: 18   Temp: 96.9 °F (36.1 °C)   TempSrc: Tympanic   SpO2: 99%   Weight: 106.1 kg (233 lb 14.5 oz)   Height: 5' 4" (1.626 m)        Physical Exam  Vitals and nursing note reviewed.   Constitutional:       General: She is not in acute distress.     Appearance: Normal appearance. She is well-developed. She is obese.   HENT:      Head: Normocephalic and atraumatic.      Right Ear: Tympanic membrane, ear canal and external ear normal.      Left Ear: Tympanic membrane, ear canal and external ear normal.      Nose: Nose normal. No mucosal edema or rhinorrhea.      Mouth/Throat:      Lips: Pink.      Mouth: Mucous membranes are moist.      Pharynx: Oropharynx is clear. Uvula midline. "   Eyes:      General: Lids are normal. No scleral icterus.     Extraocular Movements: Extraocular movements intact.      Conjunctiva/sclera: Conjunctivae normal.      Pupils: Pupils are equal, round, and reactive to light.   Neck:      Thyroid: No thyromegaly.   Cardiovascular:      Rate and Rhythm: Normal rate and regular rhythm.      Pulses: Normal pulses.   Pulmonary:      Effort: Pulmonary effort is normal.      Breath sounds: Normal breath sounds. No wheezing, rhonchi or rales.   Abdominal:      General: Bowel sounds are normal. There is no distension.      Palpations: Abdomen is soft. There is no mass.      Tenderness: There is no abdominal tenderness.   Musculoskeletal:         General: Normal range of motion.      Cervical back: Normal range of motion and neck supple.      Right lower leg: No edema.      Left lower leg: No edema.   Lymphadenopathy:      Cervical: No cervical adenopathy.   Skin:     General: Skin is warm and dry.      Findings: No lesion or rash.      Nails: There is no clubbing.   Neurological:      Mental Status: She is alert.      Cranial Nerves: No cranial nerve deficit.      Sensory: No sensory deficit.   Psychiatric:         Mood and Affect: Mood and affect normal.             Most recent labs:     Lab Results   Component Value Date    RBC 4.44 01/26/2024    HGB 13.0 01/26/2024    HCT 40.6 01/26/2024    MCV 91 01/26/2024     (H) 01/26/2024     Lab Results   Component Value Date     (L) 01/26/2024    K 4.4 01/26/2024    BUN 14 01/26/2024    CREATININE 0.7 01/26/2024    EGFRNORACEVR >60.0 01/26/2024    HGBA1C 4.9 01/21/2022     Lab Results   Component Value Date    CHOL 186 01/21/2022    TRIG 60 01/21/2022    HDL 66 01/21/2022    ALT 21 01/26/2024    AST 19 01/26/2024    ALKPHOS 59 01/26/2024     Lab Results   Component Value Date    CALCIUM 10.1 01/26/2024    [id number] 24.3 (L) 06/24/2022    MG 1.7 09/24/2018    PHOS 4.7 (H) 09/27/2018     Lab Results   Component Value  Date    TSH 3.054 01/03/2024    FREET4 0.75 01/03/2024    PTH 66.0 09/04/2018        Assessment:     1. Routine general medical examination at a health care facility    2. Class 3 severe obesity due to excess calories without serious comorbidity with body mass index (BMI) of 40.0 to 44.9 in adult          Plan:   1. Routine general medical examination at a health care facility  -     Comprehensive Metabolic Panel; Future; Expected date: 07/28/2025  -     Lipid Panel; Future; Expected date: 07/28/2025  -     TSH; Future; Expected date: 07/28/2025  -     CBC Auto Differential; Future; Expected date: 07/28/2025  -     Hemoglobin A1C; Future; Expected date: 07/28/2025    2. Class 3 severe obesity due to excess calories without serious comorbidity with body mass index (BMI) of 40.0 to 44.9 in adult  Assessment & Plan:  Wt Readings from Last 10 Encounters:   07/28/25 106.1 kg (233 lb 14.5 oz)   05/12/25 105.2 kg (232 lb)   02/02/24 101.2 kg (223 lb 1.7 oz)   01/25/24 101 kg (222 lb 10.6 oz)   01/03/24 103.9 kg (229 lb)   11/22/22 104.3 kg (230 lb)   09/30/22 104.4 kg (230 lb 2.6 oz)   07/01/22 102 kg (224 lb 13.9 oz)   06/21/22 102.1 kg (225 lb)   05/31/22 101.5 kg (223 lb 12.3 oz)     Body mass index is 40.15 kg/m².  Counseled on diet and exercise.           Information given about TDAP vaccine, advised can be obtained at pharmacy.   Health Maintenance reviewed/updated.        Check-out note:  Please follow up in 1 year for Annual exam In-person with Dr. Guevara.  Labs: Fasting lab today  Additional orders: None        Future Appointments   Date Time Provider Department Center   7/28/2025  1:30 PM LABORATORY, [facility]   5/18/2026  3:40 PM Anel Mcintosh MD [facility] [place] [facility]   7/28/2026  8:00 AM Velia Guevara MD [facility]

## 2025-07-28 NOTE — ASSESSMENT & PLAN NOTE
Wt Readings from Last 10 Encounters:   07/28/25 106.1 kg (233 lb 14.5 oz)   05/12/25 105.2 kg (232 lb)   02/02/24 101.2 kg (223 lb 1.7 oz)   01/25/24 101 kg (222 lb 10.6 oz)   01/03/24 103.9 kg (229 lb)   11/22/22 104.3 kg (230 lb)   09/30/22 104.4 kg (230 lb 2.6 oz)   07/01/22 102 kg (224 lb 13.9 oz)   06/21/22 102.1 kg (225 lb)   05/31/22 101.5 kg (223 lb 12.3 oz)     Body mass index is 40.15 kg/m².  Counseled on diet and exercise.

## (undated) DEVICE — EXTRACTOR TIPLESS 2.4FRX1115CM

## (undated) DEVICE — SOL IRR NACL .9% 3000ML

## (undated) DEVICE — IRRIGATION SET Y-TYPE TUR/BLAD

## (undated) DEVICE — WIRE GD LUB STD 3CM .038 150CM

## (undated) DEVICE — GLOVE BIOGEL 7.5

## (undated) DEVICE — PACK CYSTO

## (undated) DEVICE — GUIDE WIRE MOTION .035 X 150CM

## (undated) DEVICE — CATH 5FR OPEN END URETERAL

## (undated) DEVICE — TRAY CYSTO BASIN

## (undated) DEVICE — SYR 10CC LUER LOCK

## (undated) DEVICE — GOWN X-LG STERILE BACK

## (undated) DEVICE — VALVE ENDOSCOPIC(SURESEAL II)